# Patient Record
Sex: MALE | Race: WHITE | Employment: UNEMPLOYED | URBAN - METROPOLITAN AREA
[De-identification: names, ages, dates, MRNs, and addresses within clinical notes are randomized per-mention and may not be internally consistent; named-entity substitution may affect disease eponyms.]

---

## 2018-02-01 ENCOUNTER — OFFICE VISIT (OUTPATIENT)
Dept: INTERNAL MEDICINE CLINIC | Facility: CLINIC | Age: 33
End: 2018-02-01

## 2018-02-01 VITALS
BODY MASS INDEX: 39.17 KG/M2 | HEIGHT: 75 IN | DIASTOLIC BLOOD PRESSURE: 85 MMHG | TEMPERATURE: 98 F | HEART RATE: 80 BPM | SYSTOLIC BLOOD PRESSURE: 125 MMHG | WEIGHT: 315 LBS

## 2018-02-01 DIAGNOSIS — F32.A ANXIETY AND DEPRESSION: ICD-10-CM

## 2018-02-01 DIAGNOSIS — S99.921A INJURY OF TOE ON RIGHT FOOT, INITIAL ENCOUNTER: Primary | ICD-10-CM

## 2018-02-01 DIAGNOSIS — F41.9 ANXIETY AND DEPRESSION: ICD-10-CM

## 2018-02-01 PROCEDURE — 99212 OFFICE O/P EST SF 10 MIN: CPT | Performed by: INTERNAL MEDICINE

## 2018-02-01 PROCEDURE — 99213 OFFICE O/P EST LOW 20 MIN: CPT | Performed by: INTERNAL MEDICINE

## 2018-02-01 RX ORDER — ALPRAZOLAM 2 MG/1
TABLET ORAL
COMMUNITY
Start: 2016-08-09

## 2018-02-01 RX ORDER — PROPRANOLOL HYDROCHLORIDE 20 MG/1
TABLET ORAL
Refills: 0 | COMMUNITY
Start: 2018-01-15

## 2018-02-01 RX ORDER — PAROXETINE HYDROCHLORIDE 20 MG/1
TABLET, FILM COATED ORAL
Refills: 0 | COMMUNITY
Start: 2018-01-15

## 2018-02-01 NOTE — PROGRESS NOTES
HPI:    Patient ID: Liza Mustafa is a 33year old male. Toe Injury    The incident occurred 2 days ago. The incident occurred at home. The injury mechanism was a direct blow (dresser/drawer fell and hit his right toe).  The pain is present in the behavior is normal. Judgment and thought content normal. His mood appears anxious. His affect is not angry, not blunt, not labile and not inappropriate.  Cognition and memory are normal.              ASSESSMENT/PLAN:   (Q22.592R) Injury of toe on right foot

## 2018-06-20 ENCOUNTER — TELEPHONE (OUTPATIENT)
Dept: INTERNAL MEDICINE CLINIC | Facility: CLINIC | Age: 33
End: 2018-06-20

## 2018-06-20 DIAGNOSIS — R68.84 JAW PAIN: Primary | ICD-10-CM

## 2018-06-20 DIAGNOSIS — H92.02 LEFT EAR PAIN: ICD-10-CM

## 2018-06-20 NOTE — TELEPHONE ENCOUNTER
Spoke with patient and relayed EL message below--patient verbalizes understanding and agreement. Relayed ENT # to call for appt. Referral entered. No further questions/concerns at this time.

## 2018-06-20 NOTE — TELEPHONE ENCOUNTER
Pt states a couple of years ago had an infected tooth and that tooth ended up getting pulled along with the tooth next to it.  States since then has had constant jaw soreness and left ear gets white discharge instead of usual yellow ear wax, and itches Stat

## 2018-06-26 ENCOUNTER — OFFICE VISIT (OUTPATIENT)
Dept: OTOLARYNGOLOGY | Facility: CLINIC | Age: 33
End: 2018-06-26

## 2018-06-26 VITALS
HEIGHT: 74 IN | TEMPERATURE: 98 F | WEIGHT: 315 LBS | SYSTOLIC BLOOD PRESSURE: 98 MMHG | HEART RATE: 126 BPM | DIASTOLIC BLOOD PRESSURE: 69 MMHG | BODY MASS INDEX: 40.43 KG/M2

## 2018-06-26 DIAGNOSIS — K12.0 APHTHOUS ULCER: ICD-10-CM

## 2018-06-26 DIAGNOSIS — H60.8X3 CHRONIC ECZEMATOUS OTITIS EXTERNA OF BOTH EARS: Primary | ICD-10-CM

## 2018-06-26 PROCEDURE — 99212 OFFICE O/P EST SF 10 MIN: CPT | Performed by: OTOLARYNGOLOGY

## 2018-06-26 PROCEDURE — 99243 OFF/OP CNSLTJ NEW/EST LOW 30: CPT | Performed by: OTOLARYNGOLOGY

## 2018-06-26 RX ORDER — PAROXETINE HYDROCHLORIDE 40 MG/1
TABLET, FILM COATED ORAL
Refills: 2 | COMMUNITY
Start: 2018-06-06

## 2018-06-26 RX ORDER — FLUOCINOLONE ACETONIDE 0.11 MG/ML
3 OIL AURICULAR (OTIC) 3 TIMES DAILY
Qty: 1 BOTTLE | Refills: 0 | Status: SHIPPED | OUTPATIENT
Start: 2018-06-26 | End: 2018-07-03

## 2018-06-26 RX ORDER — VALACYCLOVIR HYDROCHLORIDE 500 MG/1
500 TABLET, FILM COATED ORAL EVERY 8 HOURS
Qty: 10 TABLET | Refills: 0 | Status: SHIPPED | OUTPATIENT
Start: 2018-06-26 | End: 2018-07-03

## 2018-06-27 NOTE — PROGRESS NOTES
Mari Zhong is a 29year old male. Patient presents with:  Ear Problem: itchy left ear, white discharge   Throat Problem: swelling right side of throat     HPI:   He has been experiencing problems with irritation in his ears.   He feels that there Skin Normal Inspection - Normal.   Constitutional Normal Overall appearance - Normal.   Head/Face Normal Facial features - Normal. Eyebrows - Normal. Skull - Normal.   Oral/Oropharynx Normal Lips -ulceration involving the left upper lip, Tonsils - Normal

## 2018-08-17 ENCOUNTER — TELEPHONE (OUTPATIENT)
Dept: OTOLARYNGOLOGY | Facility: CLINIC | Age: 33
End: 2018-08-17

## 2018-08-17 RX ORDER — NEOMYCIN SULFATE, POLYMYXIN B SULFATE AND HYDROCORTISONE 10; 3.5; 1 MG/ML; MG/ML; [USP'U]/ML
3 SUSPENSION/ DROPS AURICULAR (OTIC) 2 TIMES DAILY
Qty: 1 BOTTLE | Refills: 0 | Status: SHIPPED | OUTPATIENT
Start: 2018-08-17 | End: 2020-03-31

## 2018-08-17 NOTE — TELEPHONE ENCOUNTER
Pt states blood came out of his ear today, states it was a small amount, asking if he should make appt or what to do, requesting to speak to RN first. Pls call thank you.

## 2018-08-17 NOTE — TELEPHONE ENCOUNTER
Per pt having some blood discharge from left ear, no pain. Pt asking if he should be seen first. Per  send in rx for cortisporin otic sup 3 BID for 1 week and pt to follow up after ear drops used.

## 2019-04-22 ENCOUNTER — TELEPHONE (OUTPATIENT)
Dept: INTERNAL MEDICINE CLINIC | Facility: CLINIC | Age: 34
End: 2019-04-22

## 2019-04-22 NOTE — TELEPHONE ENCOUNTER
Thena Public is calling would like to inform Dr. Saulo Miller that pt was admitted on April 12, 2019 for major depression. Pt is at [facility].

## 2019-04-23 NOTE — TELEPHONE ENCOUNTER
[facility] Coordinator with [employer]  ci to advise Dr. Lc Alexander that patient was discharged from [facility] in 04/22/2019.

## 2019-04-26 ENCOUNTER — HOSPITAL ENCOUNTER (OUTPATIENT)
Dept: GENERAL RADIOLOGY | Age: 34
Discharge: HOME | End: 2019-04-26
Attending: INTERNAL MEDICINE
Payer: MEDICAID

## 2019-04-26 DIAGNOSIS — M54.2 NECK PAIN: ICD-10-CM

## 2019-04-26 PROCEDURE — 72040 X-RAY EXAM NECK SPINE 2-3 VW: CPT | Performed by: INTERNAL MEDICINE

## 2019-04-26 NOTE — PROGRESS NOTES
HPI:    Patient ID: Karlene Ahuja is a 36year old male. Anxiety   This is a chronic problem. The current episode started more than 1 year ago. The problem occurs intermittently. The problem has been waxing and waning.  Associated symptoms include C PO D Disp:  Rfl: 2   alprazolam 2 MG Oral Tab  Disp:  Rfl:    Methadone HCl (DOLOPHINE) 10 MG Oral Tab Take 10 mg by mouth every 6 (six) hours as needed for Pain. Pt. state he take 40 mg daily.  Disp:  Rfl:    Neomycin-Polymyxin-HC 3.5-32155-4 Otic Suspen headache, unspecified headache type  Plan: pt had been having headaches for more than a year and hasnt really changed from the start.  Typically starts from his neck and radiates to occipital area so I suspect likely muscle contraction headache;  Would star

## 2019-04-27 PROBLEM — M54.2 NECK PAIN: Status: ACTIVE | Noted: 2019-04-27

## 2019-04-27 PROBLEM — G89.29 CHRONIC NONINTRACTABLE HEADACHE: Status: ACTIVE | Noted: 2019-04-27

## 2019-04-27 PROBLEM — R51.9 CHRONIC NONINTRACTABLE HEADACHE: Status: ACTIVE | Noted: 2019-04-27

## 2019-04-30 ENCOUNTER — TELEPHONE (OUTPATIENT)
Dept: INTERNAL MEDICINE CLINIC | Facility: CLINIC | Age: 34
End: 2019-04-30

## 2019-04-30 DIAGNOSIS — M54.2 CHRONIC NECK PAIN: Primary | ICD-10-CM

## 2019-04-30 DIAGNOSIS — M47.812 SPONDYLOSIS OF CERVICAL REGION WITHOUT MYELOPATHY OR RADICULOPATHY: ICD-10-CM

## 2019-04-30 DIAGNOSIS — G89.29 CHRONIC NECK PAIN: Primary | ICD-10-CM

## 2019-05-07 ENCOUNTER — TELEPHONE (OUTPATIENT)
Dept: OTHER | Age: 34
End: 2019-05-07

## 2019-05-07 NOTE — TELEPHONE ENCOUNTER
The patient stated he tried to contact PT via Qoviat and was unable.    The phone number was sent to the patient via Twingly per his request.

## 2019-06-18 ENCOUNTER — APPOINTMENT (OUTPATIENT)
Dept: PHYSICAL THERAPY | Age: 34
End: 2019-06-18
Attending: INTERNAL MEDICINE
Payer: MEDICAID

## 2019-06-20 ENCOUNTER — APPOINTMENT (OUTPATIENT)
Dept: PHYSICAL THERAPY | Age: 34
End: 2019-06-20
Attending: INTERNAL MEDICINE
Payer: MEDICAID

## 2019-06-24 ENCOUNTER — APPOINTMENT (OUTPATIENT)
Dept: PHYSICAL THERAPY | Age: 34
End: 2019-06-24
Attending: INTERNAL MEDICINE
Payer: MEDICAID

## 2019-06-26 ENCOUNTER — APPOINTMENT (OUTPATIENT)
Dept: PHYSICAL THERAPY | Age: 34
End: 2019-06-26
Attending: INTERNAL MEDICINE
Payer: MEDICAID

## 2019-06-27 ENCOUNTER — OFFICE VISIT (OUTPATIENT)
Dept: INTERNAL MEDICINE CLINIC | Facility: CLINIC | Age: 34
End: 2019-06-27
Payer: MEDICAID

## 2019-06-27 ENCOUNTER — LAB ENCOUNTER (OUTPATIENT)
Dept: LAB | Age: 34
End: 2019-06-27
Attending: INTERNAL MEDICINE
Payer: MEDICAID

## 2019-06-27 VITALS
DIASTOLIC BLOOD PRESSURE: 81 MMHG | SYSTOLIC BLOOD PRESSURE: 132 MMHG | HEIGHT: 74 IN | BODY MASS INDEX: 40.43 KG/M2 | TEMPERATURE: 98 F | RESPIRATION RATE: 24 BRPM | HEART RATE: 99 BPM | WEIGHT: 315 LBS

## 2019-06-27 DIAGNOSIS — L65.9 ALOPECIA: ICD-10-CM

## 2019-06-27 DIAGNOSIS — K92.1 HEMATOCHEZIA: ICD-10-CM

## 2019-06-27 DIAGNOSIS — L98.9 SKIN LESION OF SCALP: Primary | ICD-10-CM

## 2019-06-27 PROCEDURE — 99214 OFFICE O/P EST MOD 30 MIN: CPT | Performed by: INTERNAL MEDICINE

## 2019-06-27 PROCEDURE — 84443 ASSAY THYROID STIM HORMONE: CPT

## 2019-06-27 PROCEDURE — 99212 OFFICE O/P EST SF 10 MIN: CPT | Performed by: INTERNAL MEDICINE

## 2019-06-27 PROCEDURE — 85025 COMPLETE CBC W/AUTO DIFF WBC: CPT

## 2019-06-27 PROCEDURE — 36415 COLL VENOUS BLD VENIPUNCTURE: CPT

## 2019-06-28 NOTE — PROGRESS NOTES
HPI:    Patient ID: Jules Sanchez is a 36year old male. Skin   This is a chronic (pt complains of scalp lesions) problem. The current episode started more than 1 year ago. The problem occurs constantly. The problem has been unchanged.  Pertinent 2   alprazolam 2 MG Oral Tab  Disp:  Rfl:    PARoxetine HCl 20 MG Oral Tab  Disp:  Rfl: 0   Propranolol HCl 20 MG Oral Tab  Disp:  Rfl: 0   Methadone HCl (DOLOPHINE) 10 MG Oral Tab Take 10 mg by mouth every 6 (six) hours as needed for Pain.  Pt. state he ta Imaging & Referrals:  [facility] - INTERNAL  [facility] - INTERNAL       #[id number]

## 2019-07-01 ENCOUNTER — APPOINTMENT (OUTPATIENT)
Dept: PHYSICAL THERAPY | Age: 34
End: 2019-07-01
Attending: INTERNAL MEDICINE
Payer: MEDICAID

## 2019-07-03 ENCOUNTER — APPOINTMENT (OUTPATIENT)
Dept: PHYSICAL THERAPY | Age: 34
End: 2019-07-03
Attending: INTERNAL MEDICINE
Payer: MEDICAID

## 2019-07-10 ENCOUNTER — OFFICE VISIT (OUTPATIENT)
Dept: PHYSICAL THERAPY | Age: 34
End: 2019-07-10
Attending: INTERNAL MEDICINE
Payer: MEDICAID

## 2019-07-10 DIAGNOSIS — G89.29 CHRONIC NECK PAIN: ICD-10-CM

## 2019-07-10 DIAGNOSIS — M54.2 CHRONIC NECK PAIN: ICD-10-CM

## 2019-07-10 DIAGNOSIS — M47.812 SPONDYLOSIS OF CERVICAL REGION WITHOUT MYELOPATHY OR RADICULOPATHY: ICD-10-CM

## 2019-07-10 PROCEDURE — 97110 THERAPEUTIC EXERCISES: CPT

## 2019-07-10 PROCEDURE — 97161 PT EVAL LOW COMPLEX 20 MIN: CPT

## 2019-07-10 NOTE — PROGRESS NOTES
P.T. EVALUATION:   Referring Physician: Dr. Robbin Whittaker  Diagnosis: Chronic neck pain (M54.2,G89.29)  Spondylosis of cervical region without myelopathy or radiculopathy (Z91.115)    Date of Onset: June 2019 Date of Service: 7/10/2019     PATIENT SUMMARY levator scap muscle    Sensation: intermittent tingling to B fingers    AROM:   Cervical ROM:  Flx: WNL (pain increases)  Ext: Min loss/WNL (pain increases)  Rot: R min loss, L min loss (pain increases on opposite side of direction of each turn)  Lat flx: soon as possible to [phone].  If you have any questions, please contact me at Dept: [phone]    Sincerely,  Electronically signed by therapist: Carla Santiago PT, DPT    [de-identified] certification required: Yes  I certify the need for these services

## 2019-07-12 ENCOUNTER — TELEPHONE (OUTPATIENT)
Dept: PHYSICAL THERAPY | Age: 34
End: 2019-07-12

## 2019-07-16 ENCOUNTER — OFFICE VISIT (OUTPATIENT)
Dept: PHYSICAL THERAPY | Age: 34
End: 2019-07-16
Attending: INTERNAL MEDICINE
Payer: MEDICAID

## 2019-07-16 DIAGNOSIS — G89.29 CHRONIC NECK PAIN: ICD-10-CM

## 2019-07-16 DIAGNOSIS — M47.812 SPONDYLOSIS OF CERVICAL REGION WITHOUT MYELOPATHY OR RADICULOPATHY: ICD-10-CM

## 2019-07-16 DIAGNOSIS — M54.2 CHRONIC NECK PAIN: ICD-10-CM

## 2019-07-16 PROCEDURE — 97110 THERAPEUTIC EXERCISES: CPT

## 2019-07-16 NOTE — PROGRESS NOTES
Diagnosis: Chronic neck pain (M54.2,G89.29)  Spondylosis of cervical region without myelopathy or radiculopathy (M47.812)    Date of Onset: June 2019        Next MD visit: none scheduled  Fall Risk: standard         Precautions: n/a          Medication Jacklyn

## 2019-07-18 ENCOUNTER — OFFICE VISIT (OUTPATIENT)
Dept: PHYSICAL THERAPY | Age: 34
End: 2019-07-18
Attending: INTERNAL MEDICINE
Payer: MEDICAID

## 2019-07-18 DIAGNOSIS — G89.29 CHRONIC NECK PAIN: ICD-10-CM

## 2019-07-18 DIAGNOSIS — M47.812 SPONDYLOSIS OF CERVICAL REGION WITHOUT MYELOPATHY OR RADICULOPATHY: ICD-10-CM

## 2019-07-18 DIAGNOSIS — M54.2 CHRONIC NECK PAIN: ICD-10-CM

## 2019-07-18 PROCEDURE — 97110 THERAPEUTIC EXERCISES: CPT

## 2019-07-18 PROCEDURE — 97140 MANUAL THERAPY 1/> REGIONS: CPT

## 2019-07-18 NOTE — PROGRESS NOTES
Diagnosis: Chronic neck pain (M54.2,G89.29)  Spondylosis of cervical region without myelopathy or radiculopathy (M47.812)    Date of Onset: June 2019        Next MD visit: none scheduled  Fall Risk: standard         Precautions: n/a          Medication Jacklyn and pain and progressed reps of scapular exercises with decreased pain noted post session. PT and patient goals are in progress.      Goals:    1- Pt will be I with maintenance and progression of HEP  2- Pt will demo increase in cervical ROM To WNL to ease

## 2019-07-23 ENCOUNTER — OFFICE VISIT (OUTPATIENT)
Dept: PHYSICAL THERAPY | Age: 34
End: 2019-07-23
Attending: INTERNAL MEDICINE
Payer: MEDICAID

## 2019-07-23 DIAGNOSIS — M47.812 SPONDYLOSIS OF CERVICAL REGION WITHOUT MYELOPATHY OR RADICULOPATHY: ICD-10-CM

## 2019-07-23 DIAGNOSIS — G89.29 CHRONIC NECK PAIN: ICD-10-CM

## 2019-07-23 DIAGNOSIS — M54.2 CHRONIC NECK PAIN: ICD-10-CM

## 2019-07-23 PROCEDURE — 97140 MANUAL THERAPY 1/> REGIONS: CPT

## 2019-07-23 PROCEDURE — 97110 THERAPEUTIC EXERCISES: CPT

## 2019-07-23 NOTE — PROGRESS NOTES
Diagnosis: Chronic neck pain (M54.2,G89.29)  Spondylosis of cervical region without myelopathy or radiculopathy (M47.812)    Date of Onset: June 2019        Next MD visit: none scheduled  Fall Risk: standard         Precautions: n/a          Medication Jacklyn ball OP 2 x 10   - seated C-extension with towel OP 2 x 10  - supine C-retraction with 1 pillow 5\" holds 2 x 10     - supine C-rotation AROM 1 x 10 each  - MARCIANO C-extension with self OP 1 x 10   - prone B shoulder extension with palms in toward sides 2 x 1

## 2019-07-25 ENCOUNTER — OFFICE VISIT (OUTPATIENT)
Dept: PHYSICAL THERAPY | Age: 34
End: 2019-07-25
Attending: INTERNAL MEDICINE
Payer: MEDICAID

## 2019-07-25 DIAGNOSIS — M47.812 SPONDYLOSIS OF CERVICAL REGION WITHOUT MYELOPATHY OR RADICULOPATHY: ICD-10-CM

## 2019-07-25 DIAGNOSIS — M54.2 CHRONIC NECK PAIN: ICD-10-CM

## 2019-07-25 DIAGNOSIS — G89.29 CHRONIC NECK PAIN: ICD-10-CM

## 2019-07-25 PROCEDURE — 97110 THERAPEUTIC EXERCISES: CPT

## 2019-07-25 PROCEDURE — 97140 MANUAL THERAPY 1/> REGIONS: CPT

## 2019-07-25 NOTE — PROGRESS NOTES
Diagnosis: Chronic neck pain (M54.2,G89.29)  Spondylosis of cervical region without myelopathy or radiculopathy (M47.812)    Date of Onset: June 2019        Next MD visit: none scheduled  Fall Risk: standard         Precautions: n/a          Medication Jacklyn cord 2 x 10    - standing B shoulder extension with RSC 2 x 10   X 30 minutes  - seated C-retraction 2 x 10   - seated thoracic extension with ball OP 1 x 10   - seated C-extension with towel OP 1 x 10  - supine C-retraction with 1 pillow 10\" holds 1 x 10

## 2019-08-06 ENCOUNTER — APPOINTMENT (OUTPATIENT)
Dept: PHYSICAL THERAPY | Age: 34
End: 2019-08-06
Attending: INTERNAL MEDICINE
Payer: MEDICAID

## 2019-08-07 ENCOUNTER — APPOINTMENT (OUTPATIENT)
Dept: PHYSICAL THERAPY | Age: 34
End: 2019-08-07
Attending: INTERNAL MEDICINE
Payer: MEDICAID

## 2019-09-09 ENCOUNTER — OFFICE VISIT (OUTPATIENT)
Dept: DERMATOLOGY CLINIC | Facility: CLINIC | Age: 34
End: 2019-09-09
Payer: MEDICAID

## 2019-09-09 DIAGNOSIS — L65.9 ALOPECIA: Primary | ICD-10-CM

## 2019-09-09 DIAGNOSIS — D48.5 NEOPLASM OF UNCERTAIN BEHAVIOR OF SKIN: ICD-10-CM

## 2019-09-09 DIAGNOSIS — D22.9 MULTIPLE NEVI: ICD-10-CM

## 2019-09-09 PROCEDURE — 99203 OFFICE O/P NEW LOW 30 MIN: CPT | Performed by: DERMATOLOGY

## 2019-09-09 RX ORDER — FINASTERIDE 1 MG/1
1 TABLET, FILM COATED ORAL DAILY
Qty: 90 TABLET | Refills: 5 | Status: SHIPPED | OUTPATIENT
Start: 2019-09-09

## 2019-09-22 NOTE — PROGRESS NOTES
Karlene Ahuja is a 36year old male. Patient presents with:  Lesion: New patient. Pt c/o of lesions to the top of the head that have gotten worse. Pt states they hurt when he vargas his hair. No personal h/x of skin cancer.  unknown family h/x (two) times daily.  Disp: 1 Bottle Rfl: 0   PARoxetine HCl 40 MG Oral Tab TK 1 T PO D Disp:  Rfl: 2   PARoxetine HCl 20 MG Oral Tab  Disp:  Rfl: 0   Propranolol HCl 20 MG Oral Tab  Disp:  Rfl: 0     Allergies:   No Known Allergies    Past Medical History: Not on file    Family History   Problem Relation Age of Onset   • Heart Attack Father    • Heart Attack Paternal Grandfather                       HPI :      Patient presents with:  Lesion: New patient.   Pt c/o of lesions to the top of the head that have g lesions likely congenital.  Has had excisions in the past.  We will have him follow-up with ENT for excision of multiple scalp lesions.     Other benign-appearing nevi reassurance    No pattern alopecia discussed topical minoxidil versus Propecia side effec

## 2020-02-25 ENCOUNTER — NURSE TRIAGE (OUTPATIENT)
Dept: INTERNAL MEDICINE CLINIC | Facility: CLINIC | Age: 35
End: 2020-02-25

## 2020-02-25 NOTE — TELEPHONE ENCOUNTER
Pt notified of Dr. Shahid Hayden message below. If symptoms worsen please go to ER pt verbalizes understanding.

## 2020-02-25 NOTE — TELEPHONE ENCOUNTER
Action Requested: Summary for Provider     []  Critical Lab, Recommendations Needed  [] Need Additional Advice  []   FYI    []   Need Orders  [] Need Medications Sent to Pharmacy  []  Other     SUMMARY: Pt c/o headache ongoing for months, had scheduled [facility]

## 2020-02-25 NOTE — TELEPHONE ENCOUNTER
Patient sent a message via [address], please see below:      Appointment For: Billie Roselia ([id number])   Visit Type: Britni Ronquillo ([id number])      2/26/2020    1:00 PM  15 mins. Samuel Davison MD  [facility]      Patient Comments:   Abimael Ferrer

## 2020-03-12 ENCOUNTER — OFFICE VISIT (OUTPATIENT)
Dept: INTERNAL MEDICINE CLINIC | Facility: CLINIC | Age: 35
End: 2020-03-12
Payer: MEDICAID

## 2020-03-12 VITALS
HEIGHT: 74 IN | TEMPERATURE: 98 F | DIASTOLIC BLOOD PRESSURE: 81 MMHG | HEART RATE: 80 BPM | BODY MASS INDEX: 40.43 KG/M2 | WEIGHT: 315 LBS | SYSTOLIC BLOOD PRESSURE: 113 MMHG

## 2020-03-12 DIAGNOSIS — G44.229 CHRONIC TENSION-TYPE HEADACHE, NOT INTRACTABLE: Primary | ICD-10-CM

## 2020-03-12 PROCEDURE — 99214 OFFICE O/P EST MOD 30 MIN: CPT | Performed by: INTERNAL MEDICINE

## 2020-03-12 RX ORDER — ARIPIPRAZOLE 5 MG/1
1 TABLET ORAL DAILY
COMMUNITY
Start: 2020-03-01

## 2020-03-12 NOTE — PROGRESS NOTES
HPI:    Patient ID: Mari Zhong is a 30year old male. Headache    This is a chronic problem. The current episode started more than 1 year ago (several years per pt). The problem occurs constantly. The problem has been gradually worsening.  The tablet 5   • Neomycin-Polymyxin-HC 3.5-38953-6 Otic Suspension Place 3 drops into the left ear 2 (two) times daily.  (Patient not taking: Reported on 3/12/2020 ) 1 Bottle 0   • PARoxetine HCl 40 MG Oral Tab TK 1 T PO D  2   • PARoxetine HCl 20 MG Oral Tab diaphoretic.               ASSESSMENT/PLAN:   (G44.229) Chronic tension-type headache, not intractable  (primary encounter diagnosis)  Plan: [facility] - INTERNAL        His headaches starts from his upper neck going to occipital area and also down to the upper

## 2020-03-25 ENCOUNTER — TELEPHONE (OUTPATIENT)
Dept: INTERNAL MEDICINE CLINIC | Facility: CLINIC | Age: 35
End: 2020-03-25

## 2020-03-25 NOTE — TELEPHONE ENCOUNTER
Pt scheduled appt through [employer] with following sx:    Appointment For: Daniel Benton ([id number])   Visit Type: Angelita Recinos ([id number])      3/27/2020   10:30 AM  15 mins. Snehal Ken MD  [facility]-INTERNAL [facility]      Patient Comments:   Headache

## 2020-03-30 ENCOUNTER — NURSE TRIAGE (OUTPATIENT)
Dept: INTERNAL MEDICINE CLINIC | Facility: CLINIC | Age: 35
End: 2020-03-30

## 2020-03-30 NOTE — TELEPHONE ENCOUNTER
The patient was informed. Instructed they are taking temperatures and if 100 or above he will not be seen with understanding.

## 2020-03-30 NOTE — TELEPHONE ENCOUNTER
Patient scheduled an appointment for tomorrow 03/31/2020 with Dr. Carla Moreira due to ear pain/ infection. Please contact patient.

## 2020-03-30 NOTE — TELEPHONE ENCOUNTER
Action Requested: Summary for Provider     []  Critical Lab, Recommendations Needed  [] Need Additional Advice  []   FYI    []   Need Orders  [] Need Medications Sent to Pharmacy  []  Other     SUMMARY: Per new process, please advise if keeping appt for to

## 2020-03-31 ENCOUNTER — OFFICE VISIT (OUTPATIENT)
Dept: INTERNAL MEDICINE CLINIC | Facility: CLINIC | Age: 35
End: 2020-03-31
Payer: MEDICAID

## 2020-03-31 VITALS
DIASTOLIC BLOOD PRESSURE: 84 MMHG | RESPIRATION RATE: 12 BRPM | TEMPERATURE: 99 F | BODY MASS INDEX: 39.17 KG/M2 | WEIGHT: 315 LBS | HEART RATE: 120 BPM | SYSTOLIC BLOOD PRESSURE: 117 MMHG | HEIGHT: 75 IN

## 2020-03-31 DIAGNOSIS — H60.502 ACUTE OTITIS EXTERNA OF LEFT EAR, UNSPECIFIED TYPE: Primary | ICD-10-CM

## 2020-03-31 PROCEDURE — 99214 OFFICE O/P EST MOD 30 MIN: CPT | Performed by: INTERNAL MEDICINE

## 2020-03-31 RX ORDER — AMOXICILLIN 875 MG/1
875 TABLET, COATED ORAL 2 TIMES DAILY
Qty: 14 TABLET | Refills: 0 | Status: SHIPPED | OUTPATIENT
Start: 2020-03-31

## 2020-03-31 RX ORDER — NEOMYCIN SULFATE, POLYMYXIN B SULFATE AND HYDROCORTISONE 10; 3.5; 1 MG/ML; MG/ML; [USP'U]/ML
4 SUSPENSION/ DROPS AURICULAR (OTIC) 4 TIMES DAILY
Qty: 1 BOTTLE | Refills: 0 | Status: SHIPPED | OUTPATIENT
Start: 2020-03-31

## 2020-03-31 NOTE — PROGRESS NOTES
HPI:    Patient ID: Levie Scheuermann is a 30year old male. Ear Pain    There is pain in the left ear. This is a new problem. The current episode started in the past 7 days (2 to 3 days). The problem occurs constantly.  The problem has been unchanged HENT:   Right Ear: Tympanic membrane, external ear and ear canal normal.   Left Ear: There is swelling and tenderness. No drainage. No foreign bodies. Decreased hearing is noted.    Nose: Nose normal.   Eyes: Pupils are equal, round, and reactive to light

## 2020-06-17 ENCOUNTER — TELEPHONE (OUTPATIENT)
Dept: NEUROLOGY | Facility: CLINIC | Age: 35
End: 2020-06-17

## 2020-06-17 ENCOUNTER — TELEMEDICINE (OUTPATIENT)
Dept: NEUROLOGY | Facility: CLINIC | Age: 35
End: 2020-06-17

## 2020-06-17 DIAGNOSIS — G43.111 INTRACTABLE MIGRAINE WITH AURA WITH STATUS MIGRAINOSUS: ICD-10-CM

## 2020-06-17 DIAGNOSIS — R51.9 NEW ONSET HEADACHE: Primary | ICD-10-CM

## 2020-06-17 PROCEDURE — 99204 OFFICE O/P NEW MOD 45 MIN: CPT | Performed by: OTHER

## 2020-06-17 RX ORDER — SUMATRIPTAN 100 MG/1
TABLET, FILM COATED ORAL
Qty: 9 TABLET | Refills: 3 | Status: SHIPPED | OUTPATIENT
Start: 2020-06-17

## 2020-06-17 RX ORDER — TOPIRAMATE 25 MG/1
TABLET ORAL
Qty: 90 TABLET | Refills: 3 | Status: SHIPPED | OUTPATIENT
Start: 2020-06-17

## 2020-06-17 NOTE — TELEPHONE ENCOUNTER
[employer] for authorization of approval for MRI brain w/wo cpt code 58219. Approval was given with Authorization Number: [id number] effective 06/17/20 to 12/14/20. Will call Pt. To inform. L/m advsing of approval. Can proceed with scheduling appt.

## 2020-06-17 NOTE — PROGRESS NOTES
I conducted a telehealth visit with Jerry Palm today, 06/17/20, which was completed using two-way, real-time interactive audio and video communication.  This has been done in good sarah to provide continuity of care in the best interest of the vomiting. He does describe occasional visual aura of zigzag lines even though he is not sure if those are preceding his headaches or not. Patient denies any focal neurological symptoms otherwise.   He has not tried any medications as reported in June 20 2 mg by mouth every 6 (six) hours as needed for Pain. Pt. state he take 40 mg daily. , Disp: , Rfl:       Seasonal                OTHER (SEE COMMENTS)    Comment:Sneezing and runny nose.     ROS:   As in HPI, the rest of the 14 system review was done and was n onset headache  Since patient claims this is a new onset headache MRI of the brain will be done to rule out any other possibility but most likely disease migraine  - MRI BRAIN (W+WO) (CPT=70553); Future    2.  Intractable migraine with aura with status migr

## 2020-10-06 ENCOUNTER — NURSE TRIAGE (OUTPATIENT)
Dept: INTERNAL MEDICINE CLINIC | Facility: CLINIC | Age: 35
End: 2020-10-06

## 2020-10-06 NOTE — TELEPHONE ENCOUNTER
Left message to call back. Transfer to triage. Patient used ear drops previously for otitis externa (see visit note with Dr. Julio Cesar Davila on 3/31/20). RN needs to triage symptoms.

## 2020-10-06 NOTE — TELEPHONE ENCOUNTER
Current Outpatient Medications:     •  Neomycin-Polymyxin-HC 3.5-99835-9 Otic Suspension, Place 4 drops into the left ear 4 (four) times daily. , Disp: 1 Bottle, Rfl: 0

## 2020-10-07 RX ORDER — NEOMYCIN SULFATE, POLYMYXIN B SULFATE AND HYDROCORTISONE 10; 3.5; 1 MG/ML; MG/ML; [USP'U]/ML
4 SUSPENSION/ DROPS AURICULAR (OTIC) 4 TIMES DAILY
Qty: 1 BOTTLE | Refills: 0 | Status: SHIPPED | OUTPATIENT
Start: 2020-10-07 | End: 2020-10-14

## 2020-10-07 NOTE — TELEPHONE ENCOUNTER
Action Requested: Summary for Provider     []  Critical Lab, Recommendations Needed  [] Need Additional Advice  []   FYI    []   Need Orders  [] Need Medications Sent to Pharmacy  []  Other     SUMMARY: Please advise - Pt requesting ear drops, stated same

## 2021-03-16 ENCOUNTER — TELEMEDICINE (OUTPATIENT)
Dept: INTERNAL MEDICINE CLINIC | Facility: CLINIC | Age: 36
End: 2021-03-16
Payer: MEDICAID

## 2021-03-16 DIAGNOSIS — G44.229 CHRONIC TENSION-TYPE HEADACHE, NOT INTRACTABLE: Primary | ICD-10-CM

## 2021-03-16 PROCEDURE — 99213 OFFICE O/P EST LOW 20 MIN: CPT | Performed by: INTERNAL MEDICINE

## 2021-03-19 NOTE — PROGRESS NOTES
HPI/Subjective:     Patient ID: Seda Padilla is a 29year old male. Headache   This is a chronic problem. The current episode started more than 1 year ago. The problem occurs intermittently. The problem has been waxing and waning.  The pain is lo MG Oral Tab   0   • Methadone HCl (DOLOPHINE) 10 MG Oral Tab Take 10 mg by mouth every 6 (six) hours as needed for Pain. Pt. state he take 40 mg daily. Allergies:  Seasonal                OTHER (SEE COMMENTS)    Comment:Sneezing and runny nose.     Pa

## 2021-04-30 ENCOUNTER — OFFICE VISIT (OUTPATIENT)
Dept: OTOLARYNGOLOGY | Facility: CLINIC | Age: 36
End: 2021-04-30
Payer: MEDICAID

## 2021-04-30 VITALS
HEIGHT: 75 IN | DIASTOLIC BLOOD PRESSURE: 93 MMHG | WEIGHT: 315 LBS | TEMPERATURE: 97 F | BODY MASS INDEX: 39.17 KG/M2 | SYSTOLIC BLOOD PRESSURE: 139 MMHG | HEART RATE: 94 BPM

## 2021-04-30 DIAGNOSIS — M54.2 NECK PAIN: Primary | ICD-10-CM

## 2021-04-30 PROCEDURE — 3080F DIAST BP >= 90 MM HG: CPT | Performed by: OTOLARYNGOLOGY

## 2021-04-30 PROCEDURE — 3008F BODY MASS INDEX DOCD: CPT | Performed by: OTOLARYNGOLOGY

## 2021-04-30 PROCEDURE — 99214 OFFICE O/P EST MOD 30 MIN: CPT | Performed by: OTOLARYNGOLOGY

## 2021-04-30 PROCEDURE — 3075F SYST BP GE 130 - 139MM HG: CPT | Performed by: OTOLARYNGOLOGY

## 2021-04-30 RX ORDER — TRAZODONE HYDROCHLORIDE 150 MG/1
150 TABLET ORAL NIGHTLY
COMMUNITY

## 2021-05-03 NOTE — PROGRESS NOTES
Talha Leon is a 29year old male.  Patient presents with:  Headache: per pt c/o of migranes, per pt pain most in back of head radiates to front, pt feels   Neck Pain: neck pain bilateral, pt had injury - pt also diagnosed with osteoarthrities Cigarettes      Smokeless tobacco: Never Used    Vaping Use      Vaping Use: Never used    Alcohol use: No      Alcohol/week: 0.0 standard drinks      Comment: rarely    Drug use: No      Comment: off drugs for 10 years       REVIEW OF SYSTEMS:   GENERAL H lot of his difficulty. I have referred him to our physiatry department for further evaluation and treatment. The patient indicates understanding of these issues and agrees to the plan. Georges Rizzo MD  5/3/2021  6:31 AM

## 2021-06-02 ENCOUNTER — EKG ENCOUNTER (OUTPATIENT)
Dept: LAB | Age: 36
End: 2021-06-02
Attending: EMERGENCY MEDICINE
Payer: MEDICAID

## 2021-06-02 ENCOUNTER — IMMUNIZATION (OUTPATIENT)
Dept: LAB | Facility: HOSPITAL | Age: 36
End: 2021-06-02
Attending: EMERGENCY MEDICINE
Payer: MEDICAID

## 2021-06-02 ENCOUNTER — LAB ENCOUNTER (OUTPATIENT)
Dept: LAB | Age: 36
End: 2021-06-02
Attending: EMERGENCY MEDICINE
Payer: MEDICAID

## 2021-06-02 DIAGNOSIS — F32.A DEPRESSION: Primary | ICD-10-CM

## 2021-06-02 DIAGNOSIS — Z23 NEED FOR VACCINATION: Primary | ICD-10-CM

## 2021-06-02 PROCEDURE — 80048 BASIC METABOLIC PNL TOTAL CA: CPT

## 2021-06-02 PROCEDURE — 93005 ELECTROCARDIOGRAM TRACING: CPT

## 2021-06-02 PROCEDURE — 36415 COLL VENOUS BLD VENIPUNCTURE: CPT

## 2021-06-02 PROCEDURE — 0001A SARSCOV2 VAC 30MCG/0.3ML IM: CPT

## 2021-06-02 PROCEDURE — 93010 ELECTROCARDIOGRAM REPORT: CPT

## 2021-06-02 PROCEDURE — 84443 ASSAY THYROID STIM HORMONE: CPT

## 2021-06-02 PROCEDURE — 85025 COMPLETE CBC W/AUTO DIFF WBC: CPT

## 2021-06-22 RX ORDER — NEOMYCIN SULFATE, POLYMYXIN B SULFATE AND HYDROCORTISONE 10; 3.5; 1 MG/ML; MG/ML; [USP'U]/ML
SUSPENSION/ DROPS AURICULAR (OTIC)
Qty: 10 ML | Refills: 0 | OUTPATIENT
Start: 2021-06-22

## 2021-06-23 ENCOUNTER — IMMUNIZATION (OUTPATIENT)
Dept: LAB | Facility: HOSPITAL | Age: 36
End: 2021-06-23
Attending: EMERGENCY MEDICINE
Payer: MEDICAID

## 2021-06-23 DIAGNOSIS — Z23 NEED FOR VACCINATION: Primary | ICD-10-CM

## 2021-06-23 PROCEDURE — 0002A SARSCOV2 VAC 30MCG/0.3ML IM: CPT

## 2021-06-30 ENCOUNTER — TELEPHONE (OUTPATIENT)
Dept: INTERNAL MEDICINE CLINIC | Facility: CLINIC | Age: 36
End: 2021-06-30

## 2021-06-30 NOTE — TELEPHONE ENCOUNTER
Pt asking that his EKG be sent to his psychiatrist-Dr Oscar Finn fax number [phone]. Faxed and confirmation received.

## 2021-09-08 ENCOUNTER — TELEPHONE (OUTPATIENT)
Dept: INTERNAL MEDICINE CLINIC | Facility: CLINIC | Age: 36
End: 2021-09-08

## 2021-09-08 DIAGNOSIS — R07.9 CHEST PAIN, UNSPECIFIED TYPE: Primary | ICD-10-CM

## 2021-09-08 NOTE — TELEPHONE ENCOUNTER
Charlie Salinas from ER at [facility] calling requesting if pt can have a cardiology referral sent over to them for a Dr Georgia Cartre.    Please advise.

## 2021-09-09 NOTE — TELEPHONE ENCOUNTER
Spoke to patient and asked him what he was seen for in the ER and he states he was having chest pain but it ended up being a panic attack but they still want him to follow up.     Referral has been pended please approve if appropriate

## 2022-05-17 ENCOUNTER — PATIENT MESSAGE (OUTPATIENT)
Dept: INTERNAL MEDICINE CLINIC | Facility: CLINIC | Age: 37
End: 2022-05-17

## 2022-05-17 NOTE — TELEPHONE ENCOUNTER
From: KELLY Bess  To: Silvia Hunt MD  Sent: 5/17/2022 2:39 PM CDT  Subject: Immobilized back pain    I made an appointment with you but it's a week or so out. Is it possible to get a referral to an orthopedic Dr. Without coming to your office.      Sincerely Lilibeth Peralta

## 2022-05-18 ENCOUNTER — NURSE TRIAGE (OUTPATIENT)
Dept: INTERNAL MEDICINE CLINIC | Facility: CLINIC | Age: 37
End: 2022-05-18

## 2022-05-18 NOTE — TELEPHONE ENCOUNTER
Per Dr. Marian Randle, please triage symptoms.      Request for Orthopedic       Future Appointments   Date Time Provider Elodia Trent   5/23/2022  2:30 PM Kavin Meyer MD [facility]

## 2022-05-18 NOTE — TELEPHONE ENCOUNTER
First Call attempt. Left Voicemail to call back our office. Office phone number provided with office hours. Advised check [email or web address] message.

## 2022-05-18 NOTE — TELEPHONE ENCOUNTER
Regarding: FW: Immobilized back pain      ----- Message -----  From: Marielle Skinner MD  Sent: 5/18/2022   8:07 AM CDT  To: [facility] Roland Triage  Subject: Immobilized back pain                            ----- Message from Savannah Scherer MD sent at 5/18/2022  8:07 AM CDT -----       ----- Message sent from Johana Georges RN to Anne Phelan at 5/17/2022  5:05 PM -----   Makenzie Little,    Your message has been received and forwarded to your provider for review. You will be contacted once a response is received. Thank you,    Valentina Genao RN      ----- Message -----       From:KELLY Bess       Sent:5/17/2022  2:39 PM CDT         To:Luke Yarbrough MD    Subject:Immobilized back pain    I made an appointment with you but it's a week or so out. Is it possible to get a referral to an orthopedic Dr. Without coming to your office.      Sincerely Farzana Garvin

## 2022-05-25 RX ORDER — BUSPIRONE HYDROCHLORIDE 15 MG/1
TABLET ORAL
COMMUNITY
Start: 2022-04-19

## 2024-06-21 ENCOUNTER — TELEPHONE (OUTPATIENT)
Dept: INTERNAL MEDICINE CLINIC | Facility: CLINIC | Age: 39
End: 2024-06-21

## 2024-06-21 NOTE — TELEPHONE ENCOUNTER
I called patient and made him aware of Dr. Yarbrough's office visit advised/offered --> Agreeable and scheduled. He will keep the schedule visit for 7/26/24 in case he needs a follow-up visit, he will cancel it if Dr. Yarbrough advises otherwise. Patient verbalized understanding. No further questions or concerns at this time.    Future Appointments   Date Time Provider Department Center   6/26/2024  2:30 PM Luke Yarbrough MD [facility]   7/26/2024  1:30 PM Luke Yarbrough MD [facility]

## 2024-06-21 NOTE — TELEPHONE ENCOUNTER
[facility] Dr. Yarbrough    Patient was contacted for reasons listed in scheduled visit. He states he is seeking a 2nd opinion and to re-establish care with Dr. Yarbrough who he had seen previously for several years. He is being treated for a blood clot by his current PCP; denies any active/acute symptoms at this time. He moved and was seeing a new provider with [facility]. He is also no longer with Medicaid and has a new plan that is [facility] PPO. No sooner visits for New Patient [last visit with Dr. Yarbrough was 3/31/2020. He will continue to follow-up with current PCP until he establishes care with Dr. Yarbrough, as scheduled, unless Dr. Yarbrough can see him sooner - he was made aware I will send message to Dr. Yarbrough. He will also obtain his medical records for visit. Patient verbalized understanding. No further questions or concerns at this time.    Future Appointments   Date Time Provider Department Center   7/26/2024  1:30 PM Luke Yarbrough MD [facility]

## 2024-06-26 ENCOUNTER — OFFICE VISIT (OUTPATIENT)
Dept: INTERNAL MEDICINE CLINIC | Facility: CLINIC | Age: 39
End: 2024-06-26

## 2024-06-26 VITALS
OXYGEN SATURATION: 98 % | SYSTOLIC BLOOD PRESSURE: 125 MMHG | WEIGHT: 315 LBS | HEART RATE: 109 BPM | BODY MASS INDEX: 40.43 KG/M2 | TEMPERATURE: 97 F | DIASTOLIC BLOOD PRESSURE: 90 MMHG | HEIGHT: 74 IN

## 2024-06-26 DIAGNOSIS — Z76.89 ENCOUNTER TO ESTABLISH CARE: Primary | ICD-10-CM

## 2024-06-26 DIAGNOSIS — E66.01 MORBID OBESITY WITH BMI OF 45.0-49.9, ADULT (HCC): ICD-10-CM

## 2024-06-26 DIAGNOSIS — E78.1 HYPERTRIGLYCERIDEMIA: ICD-10-CM

## 2024-06-26 DIAGNOSIS — F32.A ANXIETY AND DEPRESSION: ICD-10-CM

## 2024-06-26 DIAGNOSIS — E29.1 HYPOGONADISM, MALE: ICD-10-CM

## 2024-06-26 DIAGNOSIS — R79.89 ELEVATED SERUM CREATININE: ICD-10-CM

## 2024-06-26 DIAGNOSIS — I82.442 ACUTE DEEP VEIN THROMBOSIS (DVT) OF TIBIAL VEIN OF LEFT LOWER EXTREMITY (HCC): ICD-10-CM

## 2024-06-26 DIAGNOSIS — F41.9 ANXIETY AND DEPRESSION: ICD-10-CM

## 2024-06-26 DIAGNOSIS — E27.8 ADRENAL NODULE (HCC): ICD-10-CM

## 2024-06-26 PROCEDURE — 3008F BODY MASS INDEX DOCD: CPT | Performed by: INTERNAL MEDICINE

## 2024-06-26 PROCEDURE — 3080F DIAST BP >= 90 MM HG: CPT | Performed by: INTERNAL MEDICINE

## 2024-06-26 PROCEDURE — 3074F SYST BP LT 130 MM HG: CPT | Performed by: INTERNAL MEDICINE

## 2024-06-26 PROCEDURE — 99204 OFFICE O/P NEW MOD 45 MIN: CPT | Performed by: INTERNAL MEDICINE

## 2024-06-26 RX ORDER — APIXABAN 5 MG/1
5 TABLET, FILM COATED ORAL 2 TIMES DAILY
COMMUNITY
Start: 2024-06-13

## 2024-06-26 RX ORDER — FENOFIBRATE 134 MG/1
1 CAPSULE ORAL
COMMUNITY

## 2024-06-26 RX ORDER — TESTOSTERONE CYPIONATE 200 MG/ML
INJECTION, SOLUTION INTRAMUSCULAR
COMMUNITY
Start: 2024-05-02

## 2024-06-26 RX ORDER — DOXEPIN HYDROCHLORIDE 25 MG/1
1 CAPSULE ORAL NIGHTLY PRN
COMMUNITY

## 2024-06-26 NOTE — PROGRESS NOTES
Subjective:     Patient ID: Jac Bess is a 39 year old male.    Pt presents today to get established again in the clinic. He used to be patient of mine before .   He had DVT of left posterior tibial vein May 2024 and was seen in ER at that time, started on eliquis since then. Pt states had been seen by hematologist Dr Arizmendi already.    Had low testosterone 2023  and was started on testosterone therapy IM injectable  given by the pain management who had been managing pt's chronic neck/back pain.   Anxiety and depression currently being treated by psychiatrist.   Polycythemia noted recently per pt and had to have therapeutic phlebotomy. Pt has not had hx of polycythemia before.   Low HDL and triglyceride  and put on fenofibrate and Omega 3 FA.   Elevated creatinine on recent labs in ER. No previous history of CKD.   Adrenal nodule found last year but pt not aware of this.         History/Other:   Review of Systems   Constitutional: Negative.    HENT:  Positive for ear discharge.    Respiratory: Negative.     Cardiovascular: Negative.  Negative for chest pain, palpitations and leg swelling.   Gastrointestinal: Negative.    Genitourinary: Negative.    Allergic/Immunologic: Negative for immunocompromised state.     Current Outpatient Medications   Medication Sig Dispense Refill    doxepin 25 MG Oral Cap Take 1 capsule (25 mg total) by mouth nightly as needed.      ELIQUIS 5 MG Oral Tab Take 1 tablet (5 mg total) by mouth 2 (two) times daily.      fenofibrate micronized 134 MG Oral Cap Take 1 capsule (134 mg total) by mouth daily with food.      testosterone cypionate 200 mg/mL Intramuscular Solution ADMINISTER 1 ML IN THE MUSCLE EVERY WEEK AS DIRECTED      busPIRone 15 MG Oral Tab       ARIpiprazole 5 MG Oral Tab Take 1 tablet (5 mg total) by mouth daily.      Venlafaxine HCl  MG Oral Capsule SR 24 Hr TK ONE C PO D  2    alprazolam 2 MG Oral Tab       traZODone HCl 150 MG Oral Tab Take 1 tablet (150 mg  total) by mouth nightly. (Patient not taking: Reported on 6/26/2024)      Neomycin-Polymyxin-HC 3.5-35935-8 Otic Suspension Place 4 drops into the left ear 4 (four) times daily. (Patient not taking: Reported on 4/30/2021 ) 1 Bottle 0    Propranolol HCl 20 MG Oral Tab  (Patient not taking: Reported on 6/26/2024)  0     Allergies:  Allergies   Allergen Reactions    Seasonal OTHER (SEE COMMENTS)     Sneezing and runny nose.       Past Medical History:    Anxiety    Depression    DVT of lower extremity (deep venous thrombosis) (HCC)    left leg    Obesity    Panic disorder      Past Surgical History:   Procedure Laterality Date    Removal gallbladder      2020      Family History   Problem Relation Age of Onset    Heart Attack Father     Anxiety Mother     Heart Attack Paternal Grandfather       Social History:   Social History     Socioeconomic History    Marital status: Single   Tobacco Use    Smoking status: Former     Current packs/day: 0.50     Types: Cigarettes     Passive exposure: Past    Smokeless tobacco: Never   Vaping Use    Vaping status: Never Used   Substance and Sexual Activity    Alcohol use: No     Alcohol/week: 0.0 standard drinks of alcohol     Comment: rarely    Drug use: No     Comment: off drugs for 10 years        Objective:   Physical Exam  Constitutional:       General: He is not in acute distress.     Appearance: He is well-developed. He is obese. He is not ill-appearing, toxic-appearing or diaphoretic.   HENT:      Head: Normocephalic and atraumatic.      Right Ear: External ear normal.      Left Ear: External ear normal.      Nose: Nose normal.      Mouth/Throat:      Pharynx: No oropharyngeal exudate.   Eyes:      General:         Right eye: No discharge.         Left eye: No discharge.      Conjunctiva/sclera: Conjunctivae normal.      Pupils: Pupils are equal, round, and reactive to light.   Neck:      Vascular: No JVD.   Cardiovascular:      Rate and Rhythm: Normal rate and regular  rhythm.      Heart sounds: Normal heart sounds. No murmur heard.  Pulmonary:      Effort: Pulmonary effort is normal. No respiratory distress.      Breath sounds: Normal breath sounds. No wheezing or rales.   Abdominal:      General: Bowel sounds are normal. There is no distension.      Palpations: Abdomen is soft. There is no mass.      Tenderness: There is no abdominal tenderness. There is no guarding or rebound.   Musculoskeletal:         General: No tenderness. Normal range of motion.      Cervical back: Normal range of motion and neck supple.      Right lower leg: No edema.      Left lower leg: No edema.   Lymphadenopathy:      Cervical: No cervical adenopathy.   Skin:     General: Skin is warm and dry.      Findings: No rash.   Neurological:      Mental Status: He is alert and oriented to person, place, and time.         Assessment & Plan:   (Z76.89) Encounter to establish care  (primary encounter diagnosis)  Plan: pt establishing care again with the clnic. Current medical issues as discussed below.     (E29.1) Hypogonadism, male  Plan: Endocrine Referral - In Network        Diagnosed to have very low testosterone level last year after complaining of fatigue, was started apparently by his pain specialist on testosterone therapy since then. Pt states had been monitoring his labs, found to have erythrocytosis, eventually have to do phlebotomy treatment. No hx of polycythemia prior to start of testosterone therapy.  I advised to have our endo evaluate his hypogonadism .pt has no copies currently of baseline tests done when he was diagnosed to have low testosterone which will be important to know if primary or 2ndary hypogonadism .    (R79.89) Elevated serum creatinine  Plan: Basic Metabolic Panel (8), Urinalysis, Routine         [E], Protein,Total,Urine, Random [E],         Creatinine, Urine, Random [E]        Mildly elevated cratinine on last lab in ER, will repeat bmp and do ua.  Avoid nsaids.     (E27.8)  Adrenal nodule (HCC)  Plan: Endocrine Referral - In Network        [facility] a [facility] and showed left adrenal nodule 1.9cm by 1.7cm;  no further ffup done at that time, no hormonal studies. D/w pt/spouse, would also want endo to evaluate.  Both agreed.  Referral given .    (I82.442) Acute deep vein thrombosis (DVT) of tibial vein of left lower extremity (HCC)  Plan: just diagnosed with DVT of left leg last month, had been on eliquis since then. Already had seen hematologist DR Rhoades, and plan to treat for 3mos of eliquis.     (E78.1) Hypertriglyceridemia  Plan: pt on both fenofibrate and OMega 3 FA.     (F41.9,  F32.A) Anxiety and depression  Plan: pt states had been ffup with psychiatrist whom he had been seing now for at least a year, had been adjusting his psych meds.     (E66.01,  Z68.42) Morbid obesity with BMI of 45.0-49.9, adult (HCC)  Plan: pt advsied the need to lose weight; would benefit from seeing weight clinic.        No orders of the defined types were placed in this encounter.      Meds This Visit:  Requested Prescriptions      No prescriptions requested or ordered in this encounter       Imaging & Referrals:  None

## 2024-07-08 ENCOUNTER — LAB ENCOUNTER (OUTPATIENT)
Dept: LAB | Age: 39
End: 2024-07-08
Attending: INTERNAL MEDICINE
Payer: COMMERCIAL

## 2024-07-08 DIAGNOSIS — R79.89 ELEVATED SERUM CREATININE: ICD-10-CM

## 2024-07-08 LAB
ANION GAP SERPL CALC-SCNC: 4 MMOL/L (ref 0–18)
BILIRUB UR QL: NEGATIVE
BUN BLD-MCNC: 13 MG/DL (ref 9–23)
BUN/CREAT SERPL: 8.8 (ref 10–20)
CALCIUM BLD-MCNC: 9.3 MG/DL (ref 8.7–10.4)
CHLORIDE SERPL-SCNC: 107 MMOL/L (ref 98–112)
CLARITY UR: CLEAR
CO2 SERPL-SCNC: 30 MMOL/L (ref 21–32)
COLOR UR: YELLOW
CREAT BLD-MCNC: 1.47 MG/DL
CREAT UR-SCNC: 214.7 MG/DL
EGFRCR SERPLBLD CKD-EPI 2021: 62 ML/MIN/1.73M2 (ref 60–?)
FASTING STATUS PATIENT QL REPORTED: NO
GLUCOSE BLD-MCNC: 99 MG/DL (ref 70–99)
GLUCOSE UR-MCNC: NORMAL MG/DL
HGB UR QL STRIP.AUTO: NEGATIVE
KETONES UR-MCNC: NEGATIVE MG/DL
LEUKOCYTE ESTERASE UR QL STRIP.AUTO: 25
NITRITE UR QL STRIP.AUTO: NEGATIVE
OSMOLALITY SERPL CALC.SUM OF ELEC: 292 MOSM/KG (ref 275–295)
PH UR: 8 [PH] (ref 5–8)
POTASSIUM SERPL-SCNC: 4.5 MMOL/L (ref 3.5–5.1)
PROT UR-MCNC: 20 MG/DL (ref ?–14)
SODIUM SERPL-SCNC: 141 MMOL/L (ref 136–145)
SP GR UR STRIP: 1.02 (ref 1–1.03)
UROBILINOGEN UR STRIP-ACNC: NORMAL

## 2024-07-08 PROCEDURE — 80048 BASIC METABOLIC PNL TOTAL CA: CPT

## 2024-07-08 PROCEDURE — 81001 URINALYSIS AUTO W/SCOPE: CPT

## 2024-07-08 PROCEDURE — 36415 COLL VENOUS BLD VENIPUNCTURE: CPT

## 2024-07-08 PROCEDURE — 82570 ASSAY OF URINE CREATININE: CPT

## 2024-07-08 PROCEDURE — 84156 ASSAY OF PROTEIN URINE: CPT

## 2024-07-11 ENCOUNTER — OFFICE VISIT (OUTPATIENT)
Facility: LOCATION | Age: 39
End: 2024-07-11

## 2024-07-11 VITALS
BODY MASS INDEX: 40.43 KG/M2 | HEART RATE: 116 BPM | WEIGHT: 315 LBS | SYSTOLIC BLOOD PRESSURE: 118 MMHG | OXYGEN SATURATION: 96 % | HEIGHT: 74 IN | DIASTOLIC BLOOD PRESSURE: 70 MMHG

## 2024-07-11 DIAGNOSIS — Z86.718 HISTORY OF DVT (DEEP VEIN THROMBOSIS): ICD-10-CM

## 2024-07-11 DIAGNOSIS — E66.01 CLASS 3 SEVERE OBESITY WITH BODY MASS INDEX (BMI) OF 45.0 TO 49.9 IN ADULT, UNSPECIFIED OBESITY TYPE, UNSPECIFIED WHETHER SERIOUS COMORBIDITY PRESENT (HCC): ICD-10-CM

## 2024-07-11 DIAGNOSIS — F41.9 ANXIETY AND DEPRESSION: ICD-10-CM

## 2024-07-11 DIAGNOSIS — F41.9 ANXIETY: ICD-10-CM

## 2024-07-11 DIAGNOSIS — E27.8 LEFT ADRENAL MASS (HCC): Primary | ICD-10-CM

## 2024-07-11 DIAGNOSIS — Z79.01 BLOOD THINNED DUE TO LONG-TERM ANTICOAGULANT USE: ICD-10-CM

## 2024-07-11 DIAGNOSIS — R79.89 LOW TESTOSTERONE: ICD-10-CM

## 2024-07-11 DIAGNOSIS — E27.9 ADRENAL GLAND DISEASE (HCC): ICD-10-CM

## 2024-07-11 DIAGNOSIS — M54.2 NECK PAIN: ICD-10-CM

## 2024-07-11 DIAGNOSIS — F32.A ANXIETY AND DEPRESSION: ICD-10-CM

## 2024-07-11 DIAGNOSIS — G89.29 CHRONIC BACK PAIN, UNSPECIFIED BACK LOCATION, UNSPECIFIED BACK PAIN LATERALITY: ICD-10-CM

## 2024-07-11 DIAGNOSIS — M54.9 CHRONIC BACK PAIN, UNSPECIFIED BACK LOCATION, UNSPECIFIED BACK PAIN LATERALITY: ICD-10-CM

## 2024-07-11 PROBLEM — E66.813 CLASS 3 SEVERE OBESITY WITH BODY MASS INDEX (BMI) OF 45.0 TO 49.9 IN ADULT (HCC): Status: ACTIVE | Noted: 2024-07-11

## 2024-07-11 PROBLEM — E66.813 CLASS 3 SEVERE OBESITY WITH BODY MASS INDEX (BMI) OF 45.0 TO 49.9 IN ADULT: Status: ACTIVE | Noted: 2024-07-11

## 2024-07-11 PROCEDURE — 3074F SYST BP LT 130 MM HG: CPT | Performed by: INTERNAL MEDICINE

## 2024-07-11 PROCEDURE — 3078F DIAST BP <80 MM HG: CPT | Performed by: INTERNAL MEDICINE

## 2024-07-11 PROCEDURE — 3008F BODY MASS INDEX DOCD: CPT | Performed by: INTERNAL MEDICINE

## 2024-07-11 PROCEDURE — 99245 OFF/OP CONSLTJ NEW/EST HI 55: CPT | Performed by: INTERNAL MEDICINE

## 2024-07-11 RX ORDER — DEXAMETHASONE 2 MG/1
2 TABLET ORAL ONCE
Qty: 1 TABLET | Refills: 0 | Status: SHIPPED | OUTPATIENT
Start: 2024-07-11 | End: 2024-07-11

## 2024-07-11 RX ORDER — ERGOCALCIFEROL 1.25 MG/1
50000 CAPSULE ORAL WEEKLY
COMMUNITY
Start: 2024-06-30

## 2024-07-11 NOTE — PATIENT INSTRUCTIONS
Will do labs   8 am plasma free catecholamines/metanephrine, BMP, Ted/PRA, ACTH, am cortisol, DHEAs  Will do   dexamethasone suppression test - take  dexamethasone at midnight and do labs at 8 am the next morning (cortisol levels post dexamethasone)  MRI with adrenal protocol   Stop testosterone now   Will reassess hypogonadism in next visit        none

## 2024-07-11 NOTE — PROGRESS NOTES
New Patient Evaluation - History and Physical    CONSULT - Reason for Visit:   low testosterone , adrenal nodule      Requesting Physician:   ..Luke Yarbrough MD    CHIEF COMPLAINT:    Chief Complaint   Patient presents with    Consult     For hypogonadism and L adrenal nodule was found on CT of abdomen 5/23. Had labs 7/24. Was referred by Linnea Woo MD        HISTORY OF PRESENT ILLNESS:   Jac Bess is a 39 year old male who presents with  low testosterone on testosterone IM, Lt adrenal nodule on CT in 2023, DVT on blood thinner,  trying to conceive, chronic pain, methadone  use for ~ 10 yrs, obese     Was seen with his wife who is a nurse.   Has been on pain med for ~ 10 yrs. He had low enrgy.  Pain med doctor ordered testosterone and was started on tx in 2023.   He takes testo CYP.  200mg/IM 1 ML weekly   Started in 2023  Had DVT and was started on blood thinner     They are trying to conceive now and home test for sperm count which was < 25K   will see fertility specialist now      Puberty: unremarkable   He does not have  biological children.   Steroid: No   Anabolic steroids: No   Previous testosterone or \"supplements\" :  mg/week started 2023   Head trauma: No   Infection (mumps) or trauma to the testicles: No   Neck/back pain on methadone      CT ABD 5/2023 Left adrenal nodule, indeterminate. MRI is recommended for further characterization.   Gets chest pain /tightness but has THALIA /MDD  No striae   Has panic disorder   On psych meds   No HTN or spontaneous hypokalemia         ASSESSMENT AND PLAN:  39 year old man who presents with  low testosterone on testosterone IM, Lt adrenal nodule on CT in 2023, DVT on blood thinner,  trying to conceive, chronic pain, methadone  use for ~ 10 yrs, obese   Discussed with pt and his wife in length   They understand the need to stop testosterone for etiology w/u and for spermatogenesis.   He has been on methadone for yrs which can be the  etiology for hypogonadism and his symptoms    Lt adrenal on CT in 2023.   Clinically, no sx/sx of hyperaldo, cushing or pheo  He has nonspecific symptoms.   Needs biochemical w/u and MRI     Plan  Will do labs   8 am plasma free catecholamines/metanephrine, BMP, Ted/PRA, ACTH, am cortisol, DHEAs  Will do   dexamethasone suppression test - take  dexamethasone at midnight and do labs at 8 am the next morning (cortisol levels post dexamethasone)  MRI with adrenal protocol   Stop testosterone now   Will reassess hypogonadism in next visit         PAST MEDICAL HISTORY:   Past Medical History:    Anxiety    Depression    DVT of lower extremity (deep venous thrombosis) (HCC)    left leg    Obesity    Panic disorder   Chronic pain  Methadone use   Low testosterone   Low sperm count   THALIA/MDD     PAST SURGICAL HISTORY:   Past Surgical History:   Procedure Laterality Date    Removal gallbladder      2020       CURRENT MEDICATIONS:     ergocalciferol 1.25 MG (94726 UT) Oral Cap Take 1 capsule (50,000 Units total) by mouth once a week.      Omega-3 Fatty Acids (FISH OIL) 300 MG Oral Cap Take by mouth.      dexamethasone 2 MG Oral Tab Take 1 tablet (2 mg total) by mouth once for 1 dose. Take at midnight and do labs next day at 8 AM 1 tablet 0    doxepin 25 MG Oral Cap Take 1 capsule (25 mg total) by mouth nightly as needed.      ELIQUIS 5 MG Oral Tab Take 1 tablet (5 mg total) by mouth 2 (two) times daily.      fenofibrate micronized 134 MG Oral Cap Take 1 capsule (134 mg total) by mouth daily with food.      testosterone cypionate 200 mg/mL Intramuscular Solution ADMINISTER 1 ML IN THE MUSCLE EVERY WEEK AS DIRECTED      busPIRone 15 MG Oral Tab       ARIpiprazole 5 MG Oral Tab Take 1 tablet (5 mg total) by mouth daily.      Venlafaxine HCl  MG Oral Capsule SR 24 Hr TK ONE C PO D  2    alprazolam 2 MG Oral Tab          ALLERGIES:  Allergies   Allergen Reactions    Seasonal OTHER (SEE COMMENTS)     Sneezing and runny nose.        SOCIAL HISTORY:    Social History     Socioeconomic History    Marital status: Single   Tobacco Use    Smoking status: Former     Current packs/day: 0.50     Types: Cigarettes     Passive exposure: Past    Smokeless tobacco: Never   Vaping Use    Vaping status: Never Used   Substance and Sexual Activity    Alcohol use: No     Alcohol/week: 0.0 standard drinks of alcohol     Comment: rarely    Drug use: No     Comment: off drugs for 10 years   On disability   Smoking not now   Marijuana occ   Etoh rare  Drugs no  FAMILY HISTORY:   Family History   Problem Relation Age of Onset    Heart Disorder Father         CAD s/p stent 50s    Heart Attack Father     Anxiety Mother     Heart Attack Paternal Grandfather     No Known Problems Sister     No Known Problems Brother      MI in father in his late 50s   No prostate cancer   Aunt with breast cancer        REVIEW OF SYSTEMS:  All negative other than HPI    PHYSICAL EXAM:   Height: 6' 2\" (188 cm) (07/11 1523)  Weight: 369 lb 9.6 oz (167.6 kg) (07/11 1523)  BSA (Calculated - sq m): 2.82 sq meters (07/11 1523)  Pulse: 116 (07/11 1523)  BP: 118/70 (07/11 1523)  Temp: --  Do Not Use - Resp Rate: --  SpO2: 96 % (07/11 1523)    Body mass index is 47.45 kg/m².  Scar rt upper chest wall   Obese   No striae   CONSTITUTIONAL:  Awake and alert. Age appropriate, good hygiene not in acute distress. Well-nourished and well developed. no acute distress   PSYCH:   Orientated to time, place, person & situation, Normal mood and affect, memory intact, normal insight and judgment, cooperative  Neuro: speech is clear. Awake, alert, no aphasia, no facial asymmetry, no nuchal rigidity  EYES:  No proptosis, no ptosis, conjunctiva normal  ENT:  Normocephalic, atraumatic  Eye: EOMI, normal lids, no discharge, no conjunctival erythema. No exophthalmos/proptosis, Ptosis negative   No rhinorrhea, moist oral mucosa  Neck: full range of motion  Neck/Thyroid: neck inspection: normal, No scar, No  goiter   LUNGS:  No acute respiratory distress, non-labored respiration. Speaking full sentences  CARDIOVASCULAR:  regular rate   ABDOMEN:  No abdominal pain.   SKIN:  no bruising or bleeding, no rashes and no lesions, Skin is dry, no obvious rashes or lesions  EXTREMITIES: no gross abnormality   MSK: Moves extremities spontaneously. full range of motion in all major joints      DATA:     Pertinent data reviewed   CT ABD 5/2023 Left adrenal nodule, indeterminate. MRI is recommended for further characterization.    Latest Reference Range & Units 07/08/24 14:44   Potassium 3.5 - 5.1 mmol/L 4.5   Chloride 98 - 112 mmol/L 107   Carbon Dioxide, Total 21.0 - 32.0 mmol/L 30.0   BUN 9 - 23 mg/dL 13   CREATININE 0.70 - 1.30 mg/dL 1.47 (H)   (H): Data is abnormally high     No results for input(s): \"TSH\", \"T4F\", \"T3F\", \"THYP\" in the last 72 hours.  No results found.    Orders Placed This Encounter   Procedures    Basic Metabolic Panel (8)    Metanephrines, Plasma Free    Cortisol    ACTH, Plasma    Dehydroepiandrosterone Sulfate    Aldosterone, Serum    Renin, Plasma    Dexamethasone Suppression Test (Cortisol)     Orders Placed This Encounter    Basic Metabolic Panel (8)     Standing Status:   Future     Standing Expiration Date:   7/11/2025     Order Specific Question:   Release to patient     Answer:   Immediate    Metanephrines, Plasma Free     Standing Status:   Future     Standing Expiration Date:   7/11/2025    Cortisol     Standing Status:   Future     Standing Expiration Date:   7/11/2025     Order Specific Question:   Release to patient     Answer:   Immediate    ACTH, Plasma     Standing Status:   Future     Standing Expiration Date:   7/11/2025     Order Specific Question:   Release to patient     Answer:   Immediate    Dehydroepiandrosterone Sulfate     Standing Status:   Future     Standing Expiration Date:   7/11/2025     Order Specific Question:   Release to patient     Answer:   Immediate    Aldosterone,  Serum     Standing Status:   Future     Standing Expiration Date:   7/11/2025     Order Specific Question:   Release to patient     Answer:   Immediate    Renin, Plasma     Standing Status:   Future     Standing Expiration Date:   7/11/2025     Order Specific Question:   Release to patient     Answer:   Immediate    Dexamethasone Suppression Test (Cortisol)     Standing Status:   Future     Standing Expiration Date:   7/11/2025     Order Specific Question:   Release to patient     Answer:   Immediate    ergocalciferol 1.25 MG (12567 UT) Oral Cap     Sig: Take 1 capsule (50,000 Units total) by mouth once a week.    Omega-3 Fatty Acids (FISH OIL) 300 MG Oral Cap     Sig: Take by mouth.    dexamethasone 2 MG Oral Tab     Sig: Take 1 tablet (2 mg total) by mouth once for 1 dose. Take at midnight and do labs next day at 8 AM     Dispense:  1 tablet     Refill:  0          This is a specialized patient consultation in endocrinology and required comprehensive review of prior records, as well as current evaluation, with time required for consideration of complex endocrine issues and consultation. For this visit, I personally interviewed the patient, and family member if accompanied, performed the pertinent parts of the history and physical examination. ROS included screening for appropriate endocrine conditions.   Today's diagnosis and plan were reviewed in detail with the patient who states understanding and agrees with plan. I discussed with the patient possible diagnosis, differential diagnosis, need for work up, treatment options, alternatives and side effects.     Please see note for details about time spent which includes:   · pre-visit preparation  · reviewing records  · face to face time with the patient   · timely documentation of the encounter  · ordering medications/tests  · communication with care team  · care coordination    I appreciate the opportunity to be part of your patient's medical care and will keep  you, as the referring and primary physicians, informed about the care of your patient. Please feel free to contact me should you have any questions.    The 21st Century Cures Act makes medical notes like these available to patients in the interest of transparency. Please be advised this is a medical document. Medical documents are intended to carry relevant information, facts as evident, and the clinical opinion of the practitioner. The medical note is intended as peer to peer communication and may appear blunt or direct. It is written in medical language and may contain abbreviations or verbiage that are unfamiliar.   Kayce Patterson MD

## 2024-07-13 ENCOUNTER — LAB ENCOUNTER (OUTPATIENT)
Dept: LAB | Age: 39
End: 2024-07-13
Attending: INTERNAL MEDICINE
Payer: COMMERCIAL

## 2024-07-13 DIAGNOSIS — E27.8 LEFT ADRENAL MASS (HCC): ICD-10-CM

## 2024-07-13 DIAGNOSIS — E27.9 ADRENAL GLAND DISEASE (HCC): ICD-10-CM

## 2024-07-13 LAB
ANION GAP SERPL CALC-SCNC: 7 MMOL/L (ref 0–18)
BUN BLD-MCNC: 11 MG/DL (ref 9–23)
BUN/CREAT SERPL: 7.7 (ref 10–20)
CALCIUM BLD-MCNC: 9.6 MG/DL (ref 8.7–10.4)
CHLORIDE SERPL-SCNC: 105 MMOL/L (ref 98–112)
CO2 SERPL-SCNC: 28 MMOL/L (ref 21–32)
CORTIS SERPL-MCNC: 18.5 UG/DL
CORTIS SERPL-MCNC: 19.1 UG/DL
CREAT BLD-MCNC: 1.43 MG/DL
DHEA-S SERPL-MCNC: 127.6 UG/DL
EGFRCR SERPLBLD CKD-EPI 2021: 64 ML/MIN/1.73M2 (ref 60–?)
FASTING STATUS PATIENT QL REPORTED: YES
GLUCOSE BLD-MCNC: 90 MG/DL (ref 70–99)
OSMOLALITY SERPL CALC.SUM OF ELEC: 289 MOSM/KG (ref 275–295)
POTASSIUM SERPL-SCNC: 4.4 MMOL/L (ref 3.5–5.1)
SODIUM SERPL-SCNC: 140 MMOL/L (ref 136–145)

## 2024-07-13 PROCEDURE — 80048 BASIC METABOLIC PNL TOTAL CA: CPT

## 2024-07-13 PROCEDURE — 82088 ASSAY OF ALDOSTERONE: CPT

## 2024-07-13 PROCEDURE — 82533 TOTAL CORTISOL: CPT

## 2024-07-13 PROCEDURE — 82024 ASSAY OF ACTH: CPT

## 2024-07-13 PROCEDURE — 83835 ASSAY OF METANEPHRINES: CPT

## 2024-07-13 PROCEDURE — 36415 COLL VENOUS BLD VENIPUNCTURE: CPT

## 2024-07-13 PROCEDURE — 82627 DEHYDROEPIANDROSTERONE: CPT

## 2024-07-13 PROCEDURE — 84244 ASSAY OF RENIN: CPT

## 2024-07-14 ENCOUNTER — TELEPHONE (OUTPATIENT)
Dept: INTERNAL MEDICINE CLINIC | Facility: CLINIC | Age: 39
End: 2024-07-14

## 2024-07-14 DIAGNOSIS — R79.89 ELEVATED SERUM CREATININE: Primary | ICD-10-CM

## 2024-07-16 ENCOUNTER — LAB ENCOUNTER (OUTPATIENT)
Dept: LAB | Age: 39
End: 2024-07-16
Attending: INTERNAL MEDICINE
Payer: COMMERCIAL

## 2024-07-16 ENCOUNTER — TELEPHONE (OUTPATIENT)
Dept: ENDOCRINOLOGY CLINIC | Facility: CLINIC | Age: 39
End: 2024-07-16

## 2024-07-16 DIAGNOSIS — E27.9 ADRENAL GLAND DISEASE (HCC): ICD-10-CM

## 2024-07-16 DIAGNOSIS — E27.9 ADRENAL GLAND DISEASE (HCC): Primary | ICD-10-CM

## 2024-07-16 DIAGNOSIS — E27.8 LEFT ADRENAL MASS (HCC): ICD-10-CM

## 2024-07-16 LAB
ACTH: 20 PG/ML
CORTIS SERPL-MCNC: 14.9 UG/DL

## 2024-07-16 PROCEDURE — 36415 COLL VENOUS BLD VENIPUNCTURE: CPT

## 2024-07-16 PROCEDURE — 82533 TOTAL CORTISOL: CPT

## 2024-07-16 NOTE — TELEPHONE ENCOUNTER
Please check if dexamethasone suppression test was done correctly   If yes, we need salivary cortisol x3 at bedtime and 24 hr urine cortisol.   Please review with the pt how they are done thanks  SALIVARY CORTISOL    Please  the saliva collection kit from the lab. The instructions for the saliva collection will be provided to you. We need two separate salivary cortisol collections. You can do them 3 days apart.     - Do not brush teeth for 1 hour before collecting specimen.   -Do not eat or drink for 30 minutes prior to specimen collection.   -Do not drink alcohol or smoke 12 hours before  -Collect specimen at bedtime and record collection time.   -Do not apply any type of oral steroid creams as that can interfere with the test results.   -Using the [place] collection tube, remove the cotton roll & place under the tongue. Chew lightly until cotton roll is saturated with saliva. Return cotton roll to collection tube & cap. Label tube with name & collection time/date. Refrigerate tube until it can be mailed or taken to the lab      How to Collect the 24-hour Urine Specimen  Decide on a day to do the test.  On the day of the test, patient empty bladder (urinate or pee) in the toilet right after waking up. Flush the urine down the toilet.  The test begins now with the bladder empty. Write this date and the start time on the storage container’s label.  For the next 24 hours, patient will need to pee into a collection container every time they go to the bathroom. Females can use a toilet hat. Males can use a plastic urinal or pee right into the large storage container. If you do not have a toilet hat or urinal at home, you may use some other clean plastic container- or get them from labs   Before using the plastic container for the first time, wash it with dish soap and then rinse at least 10 times with tap water. Allow it to air dry completely.  Do not let feces (poop) mix with the urine or else the test will  need to be restarted.  Pour the urine into the large storage container and close the lid tightly. Be very careful not to spill any urine.  If using a collection container, rinse it with water only. Put it back by the toilet to remind you to use it the next time. Allow it to air dry completely.  Put the large storage container in the refrigerator ( or in put ice around the container and place in larger container). The urine must be kept cool at all times. If you do not have space in the refrigerator, you can store it in a cooler on top of Add more ice as needed to keep the urine cold.  Each time patient pees during the day and night, follow steps to collect urine.  The next day (close to the same time that you started on the first day), patient needs to pee into the collection container one last time. Add it to the large storage container. This ends the 24-hour collection.  Write the date and time of this last urine collection on the label.  Attach a list of all medicines, including over-the-counter medicines, vitamins and herbal remedies, patient took during the 24-hour urine collection.    Take the urine to a Laboratory (Lab) Service Center as soon as possible, within 24 hours after ending the collection. Keep the urine cool.  Make sure the urine does not freeze for these tests: amylase, arylsulfatase, immunoelectrophoresis, micro-albumin, pregnanetriol, protein or uric acid.  You will need to start the test over if any of the urine spilled, you forgot to save some or it has poop in it. If you must restart the test, it is okay to use the same collection and storage containers. Pour out the urine, clean the containers well and allow them to air dry. Then follow

## 2024-07-18 LAB — RENIN ACTIVITY: 2.08 NG/ML/HR

## 2024-07-18 NOTE — TELEPHONE ENCOUNTER
Called and spoke to patient, he reports he did the dexamethasone suppression test correctly.     Informed patient Dr. Patterson would like you to complete salivary cortisol x3 and 24 urine cortisol. Reviewed instructions with the patient as well as send them via my chart. Patient to  containers at the lab.    Patient understanding to all instructions provided.

## 2024-07-19 LAB
METANEPHRINE: <25 PG/ML
NORMETANEPHRINE: 69.2 PG/ML

## 2024-07-21 ENCOUNTER — LAB ENCOUNTER (OUTPATIENT)
Dept: LAB | Age: 39
End: 2024-07-21
Attending: INTERNAL MEDICINE
Payer: COMMERCIAL

## 2024-07-22 ENCOUNTER — LAB ENCOUNTER (OUTPATIENT)
Dept: LAB | Age: 39
End: 2024-07-22
Attending: INTERNAL MEDICINE
Payer: COMMERCIAL

## 2024-07-22 LAB — ALDOSTERONE: 20.6 NG/DL

## 2024-07-25 ENCOUNTER — LAB ENCOUNTER (OUTPATIENT)
Dept: LAB | Age: 39
End: 2024-07-25
Attending: INTERNAL MEDICINE
Payer: COMMERCIAL

## 2024-07-26 ENCOUNTER — LAB ENCOUNTER (OUTPATIENT)
Dept: LAB | Age: 39
End: 2024-07-26
Attending: INTERNAL MEDICINE
Payer: COMMERCIAL

## 2024-07-27 ENCOUNTER — LAB ENCOUNTER (OUTPATIENT)
Dept: LAB | Age: 39
End: 2024-07-27
Attending: INTERNAL MEDICINE
Payer: COMMERCIAL

## 2024-07-29 ENCOUNTER — TELEPHONE (OUTPATIENT)
Dept: INTERNAL MEDICINE CLINIC | Facility: CLINIC | Age: 39
End: 2024-07-29

## 2024-07-29 NOTE — TELEPHONE ENCOUNTER
Clarified with norma Christensen to add on Wednesday 07/31.  Patient notified and added to the schedule.

## 2024-07-29 NOTE — TELEPHONE ENCOUNTER
Verified name and .    Patient is calling to request an appointment with Dr. Yarbrough only.    He states that he has been seeing endocrinologist for for left adrenal mass and has upcoming appointment with nephrologist for elevated creatinine.    He states that he continues to have lower back pain and that due to the shayy issues, he has had worsened anxiety.    He states he does see a psychiatrist but that he would like to see Dr. Yarbrough to discuss his health issues and anxiety.    Patient is requesting that message be sent to Dr. Yarbrough to request to be added to the schedule. He states that he is available any day.

## 2024-07-30 ENCOUNTER — TELEPHONE (OUTPATIENT)
Dept: INTERNAL MEDICINE CLINIC | Facility: CLINIC | Age: 39
End: 2024-07-30

## 2024-07-30 NOTE — TELEPHONE ENCOUNTER
Called and spoke with patient who said he was able to get the early refill of #[phone] from his psychiatrist.  Feels OK presently.  FYI to Dr Yarbrough.    Future Appointments   Date Time Provider Department Center   8/23/2024 11:00 AM [facility] MRI RM1 (1.5T WIDE) [facility] MRI EDW [facility]   8/23/2024  1:00 PM [facility] US RM1 [facility] US W [facility]   8/29/2024  8:15 AM [facility]   8/30/2024  3:00 PM Dallas Warner MD [id number]

## 2024-07-30 NOTE — TELEPHONE ENCOUNTER
Spoke to patient. He said that he cannot get a hold of his psychiatrist and needs an early refill on Alprazolam 2 mg BID. His health concerns are causing him more anxiety and he needs to take more medication. RN did relay that really should be discussed with his psychiatry provider. He is coming to see Dr. Yarbrough tomorrow but is asking if he can refill this.     Rn asked for psychiatry information- Griffin Bobo [phone].[facility]- left message on nursing line to please reach out to patient to discuss his refill.     Dr. Yarbrough, please advise if you will authorize alprazolam script.

## 2024-07-30 NOTE — TELEPHONE ENCOUNTER
Pls ffup on this for pt if psych has given him early refil for his xanax.  I dont really feel comfortable refilling this med for pt since it's prescribed and managed by his psychiatrist so they should be managing it.

## 2024-07-31 ENCOUNTER — HOSPITAL ENCOUNTER (OUTPATIENT)
Dept: GENERAL RADIOLOGY | Age: 39
Discharge: HOME | End: 2024-07-31
Attending: INTERNAL MEDICINE
Payer: COMMERCIAL

## 2024-07-31 ENCOUNTER — OFFICE VISIT (OUTPATIENT)
Dept: INTERNAL MEDICINE CLINIC | Facility: CLINIC | Age: 39
End: 2024-07-31

## 2024-07-31 VITALS
TEMPERATURE: 99 F | HEIGHT: 74 IN | DIASTOLIC BLOOD PRESSURE: 89 MMHG | BODY MASS INDEX: 40.43 KG/M2 | SYSTOLIC BLOOD PRESSURE: 119 MMHG | OXYGEN SATURATION: 98 % | HEART RATE: 103 BPM | WEIGHT: 315 LBS

## 2024-07-31 DIAGNOSIS — G89.29 CHRONIC BILATERAL LOW BACK PAIN WITHOUT SCIATICA: ICD-10-CM

## 2024-07-31 DIAGNOSIS — M54.50 CHRONIC BILATERAL LOW BACK PAIN WITHOUT SCIATICA: ICD-10-CM

## 2024-07-31 DIAGNOSIS — F41.1 GAD (GENERALIZED ANXIETY DISORDER): ICD-10-CM

## 2024-07-31 DIAGNOSIS — G89.29 CHRONIC BILATERAL LOW BACK PAIN WITHOUT SCIATICA: Primary | ICD-10-CM

## 2024-07-31 DIAGNOSIS — M54.50 CHRONIC BILATERAL LOW BACK PAIN WITHOUT SCIATICA: Primary | ICD-10-CM

## 2024-07-31 PROCEDURE — 3079F DIAST BP 80-89 MM HG: CPT | Performed by: INTERNAL MEDICINE

## 2024-07-31 PROCEDURE — 3008F BODY MASS INDEX DOCD: CPT | Performed by: INTERNAL MEDICINE

## 2024-07-31 PROCEDURE — 3074F SYST BP LT 130 MM HG: CPT | Performed by: INTERNAL MEDICINE

## 2024-07-31 PROCEDURE — 72100 X-RAY EXAM L-S SPINE 2/3 VWS: CPT | Performed by: INTERNAL MEDICINE

## 2024-07-31 PROCEDURE — 99214 OFFICE O/P EST MOD 30 MIN: CPT | Performed by: INTERNAL MEDICINE

## 2024-07-31 RX ORDER — HYDROXYZINE PAMOATE 25 MG/1
CAPSULE ORAL
COMMUNITY

## 2024-07-31 RX ORDER — DEXAMETHASONE 2 MG/1
TABLET ORAL
COMMUNITY
Start: 2024-07-11

## 2024-07-31 RX ORDER — NAPROXEN 500 MG/1
TABLET ORAL
COMMUNITY
Start: 2024-05-30

## 2024-07-31 NOTE — PROGRESS NOTES
Subjective:     Patient ID: Jac Bess is a 39 year old male.    Low Back Pain  This is a chronic problem. The current episode started more than 1 month ago (4mos). The problem occurs constantly. The problem has been gradually worsening since onset. The pain is present in the lumbar spine. The quality of the pain is described as aching. The pain does not radiate. The pain is at a severity of 6/10. The pain is moderate. The pain is Worse during the day. The symptoms are aggravated by bending and twisting. Stiffness is present All day. Pertinent negatives include no bladder incontinence, bowel incontinence, dysuria, fever, numbness, paresis, paresthesias, perianal numbness or weakness. Risk factors include obesity. He has tried NSAIDs for the symptoms. The treatment provided no relief.       History/Other:   Review of Systems   Constitutional: Negative.  Negative for fever.   Gastrointestinal: Negative.  Negative for bowel incontinence.   Genitourinary: Negative.  Negative for bladder incontinence and dysuria.   Musculoskeletal:  Positive for back pain.   Neurological:  Negative for weakness, numbness and paresthesias.   Psychiatric/Behavioral:  The patient is nervous/anxious.      Current Outpatient Medications   Medication Sig Dispense Refill    ergocalciferol 1.25 MG (93030 UT) Oral Cap Take 1 capsule (50,000 Units total) by mouth once a week.      Omega-3 Fatty Acids (FISH OIL) 300 MG Oral Cap Take by mouth.      doxepin 25 MG Oral Cap Take 1 capsule (25 mg total) by mouth nightly as needed.      ELIQUIS 5 MG Oral Tab Take 1 tablet (5 mg total) by mouth 2 (two) times daily.      fenofibrate micronized 134 MG Oral Cap Take 1 capsule (134 mg total) by mouth daily with food.      busPIRone 15 MG Oral Tab       ARIpiprazole 5 MG Oral Tab Take 1 tablet (5 mg total) by mouth daily.      Venlafaxine HCl  MG Oral Capsule SR 24 Hr TK ONE C PO D  2    alprazolam 2 MG Oral Tab       dexamethasone 2 MG Oral Tab  TAKE 1 TABLET BY MOUTH FOR 1 DOSE. TAKE AT MIDNIGHT AND DO LABS NEXT DAY AT 8 AM (Patient not taking: Reported on 7/31/2024)      hydrOXYzine Pamoate 25 MG Oral Cap  (Patient not taking: Reported on 7/31/2024)      naproxen 500 MG Oral Tab TAKE 1 TABLET BY MOUTH EVERY 12 HOURS WITH FOOD OR MILK FOR 10 DAYS AS NEEDED (Patient not taking: Reported on 7/31/2024)      testosterone cypionate 200 mg/mL Intramuscular Solution ADMINISTER 1 ML IN THE MUSCLE EVERY WEEK AS DIRECTED (Patient not taking: Reported on 7/31/2024)      Neomycin-Polymyxin-HC 3.5-71251-2 Otic Suspension Place 4 drops into the left ear 4 (four) times daily. (Patient not taking: Reported on 4/30/2021) 1 Bottle 0     Allergies:  Allergies   Allergen Reactions    Seasonal OTHER (SEE COMMENTS)     Sneezing and runny nose.       Past Medical History:    Anxiety    Depression    DVT of lower extremity (deep venous thrombosis) (HCC)    left leg    Obesity    Panic disorder      Past Surgical History:   Procedure Laterality Date    Removal gallbladder      2020      Family History   Problem Relation Age of Onset    Heart Disorder Father         CAD s/p stent 50s    Heart Attack Father     Anxiety Mother     Heart Attack Paternal Grandfather     No Known Problems Sister     No Known Problems Brother       Social History:   Social History     Socioeconomic History    Marital status: Single   Tobacco Use    Smoking status: Former     Current packs/day: 0.50     Types: Cigarettes     Passive exposure: Past    Smokeless tobacco: Never   Vaping Use    Vaping status: Every Day   Substance and Sexual Activity    Alcohol use: No     Alcohol/week: 0.0 standard drinks of alcohol     Comment: rarely    Drug use: No     Comment: off drugs for 10 years        Objective:   Physical Exam  Constitutional:       General: He is not in acute distress.     Appearance: He is obese. He is not ill-appearing, toxic-appearing or diaphoretic.   Cardiovascular:      Rate and Rhythm:  Normal rate and regular rhythm.      Heart sounds: Normal heart sounds. No murmur heard.  Pulmonary:      Effort: Pulmonary effort is normal. No respiratory distress.      Breath sounds: Normal breath sounds. No stridor. No wheezing or rales.   Abdominal:      General: Bowel sounds are normal. There is no distension.      Palpations: Abdomen is soft.      Tenderness: There is no abdominal tenderness.   Musculoskeletal:      Cervical back: Normal range of motion and neck supple. No rigidity or tenderness.      Lumbar back: Tenderness and bony tenderness present. No swelling, edema, deformity or signs of trauma. Decreased range of motion. Negative right straight leg raise test and negative left straight leg raise test. No scoliosis.      Right lower leg: No edema.      Left lower leg: No edema.   Lymphadenopathy:      Cervical: No cervical adenopathy.   Skin:     Coloration: Skin is not jaundiced or pale.      Findings: No erythema or rash.   Neurological:      Mental Status: He is alert.      Sensory: Sensation is intact.      Motor: Motor function is intact. No weakness, tremor, atrophy or abnormal muscle tone.      Deep Tendon Reflexes: Babinski sign absent on the right side. Babinski sign absent on the left side.      Reflex Scores:       Patellar reflexes are 2+ on the right side and 2+ on the left side.       Achilles reflexes are 2+ on the right side and 2+ on the left side.        Assessment & Plan:   (M54.50,  G89.29) Chronic bilateral low back pain without sciatica  (primary encounter diagnosis)  Plan: XR LUMBAR SPINE (MIN 2 VIEWS) (CPT=72100),         Physical Therapy Referral - [facility]        Neuro exam no deficit noted, he has hx of DDD and lumbar spondylosis per pt. We will do get xray lumbar spine and erferred pt to PT.  Pt told not take nsaids due to his elevated creatinine before. He is on methadone  which should help manage his low back pain, may also use otc lidocaine patches. Pt told ot  call back if pain persist/worsens.     (F41.1) THALIA (generalized anxiety disorder)  Plan: [facility] Referral - In         Network, [facility] Referral - In Network        Pt had requested to see another psychiatrist and states not really satisfied with care/management from his current psychiatrist. We [place] [facility] [place] [facility]        No orders of the defined types were placed in this encounter.      Meds This Visit:  Requested Prescriptions      No prescriptions requested or ordered in this encounter       Imaging & Referrals:  None

## 2024-08-04 ENCOUNTER — TELEPHONE (OUTPATIENT)
Age: 39
End: 2024-08-04

## 2024-08-09 ENCOUNTER — LAB ENCOUNTER (OUTPATIENT)
Dept: LAB | Age: 39
End: 2024-08-09
Attending: INTERNAL MEDICINE
Payer: COMMERCIAL

## 2024-08-09 DIAGNOSIS — I82.422 THROMBOSIS OF LEFT ILIAC VEIN (HCC): Primary | ICD-10-CM

## 2024-08-09 LAB
INR BLD: 0.99 (ref 0.8–1.2)
PROTHROMBIN TIME: 13.6 SECONDS (ref 11.6–14.8)

## 2024-08-09 PROCEDURE — 85390 FIBRINOLYSINS SCREEN I&R: CPT

## 2024-08-09 PROCEDURE — 85306 CLOT INHIBIT PROT S FREE: CPT

## 2024-08-09 PROCEDURE — 85598 HEXAGNAL PHOSPH PLTLT NEUTRL: CPT

## 2024-08-09 PROCEDURE — 36415 COLL VENOUS BLD VENIPUNCTURE: CPT

## 2024-08-09 PROCEDURE — 85730 THROMBOPLASTIN TIME PARTIAL: CPT

## 2024-08-09 PROCEDURE — 85610 PROTHROMBIN TIME: CPT

## 2024-08-09 PROCEDURE — 85300 ANTITHROMBIN III ACTIVITY: CPT

## 2024-08-09 PROCEDURE — 81241 F5 GENE: CPT

## 2024-08-09 PROCEDURE — 85613 RUSSELL VIPER VENOM DILUTED: CPT

## 2024-08-09 PROCEDURE — 86147 CARDIOLIPIN ANTIBODY EA IG: CPT

## 2024-08-09 PROCEDURE — 86146 BETA-2 GLYCOPROTEIN ANTIBODY: CPT

## 2024-08-09 PROCEDURE — 85302 CLOT INHIBIT PROT C ANTIGEN: CPT

## 2024-08-11 ENCOUNTER — TELEPHONE (OUTPATIENT)
Age: 39
End: 2024-08-11

## 2024-08-12 LAB
B2 GLYCOPROT1 IGG SERPL IA-ACNC: 2.6 U/ML (ref ?–7)
B2 GLYCOPROT1 IGM SERPL IA-ACNC: <2.4 U/ML (ref ?–7)
CARDIOLIPIN IGG SERPL-ACNC: 4.5 GPL (ref ?–10)
CARDIOLIPIN IGM SERPL-ACNC: <0.9 MPL (ref ?–10)
F5 P.R506Q BLD/T QL: NORMAL
PROTEIN C AG: 116 %

## 2024-08-13 LAB
ANTITHROMBIN: 107 %
APTT PPP: 27.7 SECONDS (ref 23–36)
CONFIRM APTT STACLOT: NEGATIVE
CONFIRM DRVVT: 1.3 S (ref 0–1.1)
PROTEIN S FUNCTION: 105 %
PROTHROMBIN TIME: 13.8 SECONDS (ref 11.6–14.8)
SCREEN DRVVT: 1.1 S (ref 0–1.1)

## 2024-08-15 ENCOUNTER — PATIENT MESSAGE (OUTPATIENT)
Dept: INTERNAL MEDICINE CLINIC | Facility: CLINIC | Age: 39
End: 2024-08-15

## 2024-08-15 ENCOUNTER — TELEPHONE (OUTPATIENT)
Dept: INTERNAL MEDICINE CLINIC | Facility: CLINIC | Age: 39
End: 2024-08-15

## 2024-08-15 DIAGNOSIS — E27.9 ADRENAL NODULE (HCC): Primary | ICD-10-CM

## 2024-08-15 NOTE — TELEPHONE ENCOUNTER
I think he is already on alprazolam 2mg bid from his psychiatrist and this is what I usually give my patients when they go for mri so he can take 2mg a bout 15 min to 30min before his mri.   
Patient advised of Dr. Yarbrough's notes and verbalized understanding.  
Patient is requesting sedative  medication .  Hx anxiety , panic and extreme claustrophobia.  He is scheduled for  MRI on 8/23/24.   Preferred pharmacy verified.       Future Appointments   Date Time Provider Department Center   8/23/2024 11:00 AM [facility] MRI RM1 (1.5T WIDE) [facility] MRI EDW [facility]   8/23/2024  1:00 PM [facility]   8/27/2024  1:45 PM Mariana Tran PA-C [facility]   8/29/2024  8:15 AM Kayce Patterson MD [facility]GDGENDO [facility]   8/30/2024  3:00 PM Dallas Warner MD [id number]       
[see HPI] : see HPI

## 2024-08-16 NOTE — TELEPHONE ENCOUNTER
[Last office visit was on 7/31/24 with Dr. Yarbrough]    Dr. Yarbrough - please see [facility] message and advise    Future Appointments   Date Time Provider Department Center   8/23/2024 11:15 AM [facility] MRI RM1 (1.5T WIDE) [facility] MRI EDW [facility]

## 2024-08-17 NOTE — TELEPHONE ENCOUNTER
I think he should get be getting iv sedation for his mri since he has claustrophobia.   Can we order IV sedation with his MRI at [facility]. Thanks

## 2024-08-19 NOTE — TELEPHONE ENCOUNTER
Spoke with MRI at [facility].  They cannot perform sedation at the [place] location, a new order would need to be placed.  Patient's exam was ordered by Dr. Patterson.  Dr. Yarbrough, do you wish to place the new order or should patient contact specialist?

## 2024-08-20 ENCOUNTER — TELEPHONE (OUTPATIENT)
Dept: ENDOCRINOLOGY CLINIC | Facility: CLINIC | Age: 39
End: 2024-08-20

## 2024-08-20 DIAGNOSIS — E27.8 LEFT ADRENAL MASS (HCC): Primary | ICD-10-CM

## 2024-08-20 NOTE — TELEPHONE ENCOUNTER
Patient called back.  Currently re-scheduled for 09/27 for MRI with sedation.  Dr. Yarbrough, are you able to use office visit from 07/31 for clearance or will the patient need to be seen again?

## 2024-08-20 NOTE — TELEPHONE ENCOUNTER
Ok to take small dose of Lorazepam 30 minutes prior but he needs a ride , do not drive after taking Lorazepam  Thanks

## 2024-08-20 NOTE — TELEPHONE ENCOUNTER
Spoke with pt. States it was recommended by his PCP to have IV sedation for MRI d/t anxiety. States that he is unable to have MRI on 8/23 because the facility he was going to have MRI done at is unable to perform IV sedation.    Facility stated that pt is to have new order for MRI stating IV sedation required. Pt states once he has this order, he will schedule MRI at the hospital.    Dr Patterson,   please review pended order. Pt does have elevated creatinine, 1.43 on 7/13.    Thank you!

## 2024-08-20 NOTE — TELEPHONE ENCOUNTER
Dr Patterson,    Patient has claustrophobia and needs new order for MRI with IV sedation.    Order pended, please sign if appropriate.    Thanks

## 2024-08-20 NOTE — TELEPHONE ENCOUNTER
Dr Luke Yarbrough,    Dr Patterson is recommending Alprazolam 0.5 mg before MRI. Patient notified.     Can you send order for MRI with IV sedation?            Thank you.

## 2024-08-20 NOTE — TELEPHONE ENCOUNTER
Patient contacted and notified of limitations and changes necessary to his MRI order.  He will contact Dr. Patterson for further advice.  Message routed to clinical staff.

## 2024-08-20 NOTE — TELEPHONE ENCOUNTER
Patient calling states needs to under go IV sedation and unsure if needs new orders for MRI and ultrasound. Please call, patient is scheduled for 8/23/24.

## 2024-08-24 ENCOUNTER — NURSE TRIAGE (OUTPATIENT)
Dept: INTERNAL MEDICINE CLINIC | Facility: CLINIC | Age: 39
End: 2024-08-24

## 2024-08-24 NOTE — TELEPHONE ENCOUNTER
Action Requested: Summary for Provider     []  Critical Lab, Recommendations Needed  [] Need Additional Advice  [x]   FYI    []   Need Orders  [] Need Medications Sent to Pharmacy  []  Other     SUMMARY: Going to [facility]     Reason for call: Acute  Onset: Today     Rn called patient. Patient's date of birth and full name both confirmed.   Audible shortness of breath.   Chest pain  Woke up feeling disoriented.   Reports anxiety.   Has new psychiatrist appointment on Wednesday.   Patient concerned about withdrawal from alprazolam.   Out of alprazolam now, last dose 5:00pm yesterday 8/23/24.   Usually takes Alprazolam 2mg, twice daily     Triaged and advised care advice   Denies suicidal ideations and behaviors.   Denies self injury ideations and behaviors.     Someone is with him. RN can hear femaile     Evaluation advised Emergency Room now.   RN offered to call 911. Declines.   The person with him will take patient to [facility] Room now, per patient that is 5 minutes away.     Advised to monitor symptoms.  RN advised if symptoms get severely worse, call 911.   Patient verbalizes understanding and is agreeable to instructions.

## 2024-08-24 NOTE — TELEPHONE ENCOUNTER
Name:SATURDAY TRIAGE RN                      2024 10:26:00 AM  ProfileId:    [id number]                   [facility]  Department:[facility]  ======================================================================  Text Messages    Message # [id number]         2024 10:23a   [MONICAG]  To:  SATURDAY TRIAGE RN  From:  KELLY Corral MD:  DR SANTOS  Phone#:  [phone]  ----------------------------------------------------------------------  RE PT HAVING ANXIETY , STATES NEEDS MEDICATION REFILL. DOESNT WANT TO GO TO ER DEPT.  86        (Message Is Not Delivered)  ======================================================================    Reason for Disposition  • Difficulty breathing and persists > 10 minutes and not relieved by reassurance provided by triager    Protocols used: Anxiety and Panic Attack-A-OH

## 2024-08-26 NOTE — TELEPHONE ENCOUNTER
Pre-op scheduled for 9/16 at 8:00 AM, 30 minutes created overbooking, do you want appointment time changed to 15 minutes, please advise.    Spoke to patient, full name and date of birth verified.   Appointment scheduled for 9/16/24 at 8:00 AM.

## 2024-08-29 ENCOUNTER — OFFICE VISIT (OUTPATIENT)
Facility: LOCATION | Age: 39
End: 2024-08-29
Payer: COMMERCIAL

## 2024-08-29 VITALS
DIASTOLIC BLOOD PRESSURE: 66 MMHG | OXYGEN SATURATION: 97 % | HEIGHT: 74 IN | BODY MASS INDEX: 40.43 KG/M2 | HEART RATE: 112 BPM | SYSTOLIC BLOOD PRESSURE: 108 MMHG | WEIGHT: 315 LBS

## 2024-08-29 DIAGNOSIS — G89.29 CHRONIC BACK PAIN, UNSPECIFIED BACK LOCATION, UNSPECIFIED BACK PAIN LATERALITY: ICD-10-CM

## 2024-08-29 DIAGNOSIS — F41.9 ANXIETY AND DEPRESSION: ICD-10-CM

## 2024-08-29 DIAGNOSIS — E66.01 CLASS 3 SEVERE OBESITY WITH BODY MASS INDEX (BMI) OF 45.0 TO 49.9 IN ADULT, UNSPECIFIED OBESITY TYPE, UNSPECIFIED WHETHER SERIOUS COMORBIDITY PRESENT (HCC): ICD-10-CM

## 2024-08-29 DIAGNOSIS — E27.9 ADRENAL GLAND DISEASE (HCC): ICD-10-CM

## 2024-08-29 DIAGNOSIS — R79.89 LOW TESTOSTERONE: ICD-10-CM

## 2024-08-29 DIAGNOSIS — E27.8 LEFT ADRENAL MASS (HCC): Primary | ICD-10-CM

## 2024-08-29 DIAGNOSIS — M54.9 CHRONIC BACK PAIN, UNSPECIFIED BACK LOCATION, UNSPECIFIED BACK PAIN LATERALITY: ICD-10-CM

## 2024-08-29 DIAGNOSIS — Z79.01 BLOOD THINNED DUE TO LONG-TERM ANTICOAGULANT USE: ICD-10-CM

## 2024-08-29 DIAGNOSIS — F32.A ANXIETY AND DEPRESSION: ICD-10-CM

## 2024-08-29 DIAGNOSIS — Z86.718 HISTORY OF DVT (DEEP VEIN THROMBOSIS): ICD-10-CM

## 2024-08-29 DIAGNOSIS — F41.9 ANXIETY: ICD-10-CM

## 2024-08-29 DIAGNOSIS — M54.2 NECK PAIN: ICD-10-CM

## 2024-08-29 PROCEDURE — 3008F BODY MASS INDEX DOCD: CPT | Performed by: INTERNAL MEDICINE

## 2024-08-29 PROCEDURE — 3078F DIAST BP <80 MM HG: CPT | Performed by: INTERNAL MEDICINE

## 2024-08-29 PROCEDURE — 99214 OFFICE O/P EST MOD 30 MIN: CPT | Performed by: INTERNAL MEDICINE

## 2024-08-29 PROCEDURE — 3074F SYST BP LT 130 MM HG: CPT | Performed by: INTERNAL MEDICINE

## 2024-08-29 NOTE — PATIENT INSTRUCTIONS
Fasting AM labs to check testosterone in late Oct and RTC after that   MRI soon   Follow up with nephrology

## 2024-08-29 NOTE — PROGRESS NOTES
Reason for Visit:   low testosterone , adrenal nodule      Requesting Physician:   ..Luke Yarbrough MD    CHIEF COMPLAINT:    Chief Complaint   Patient presents with    Hypogonadism    Adrenal Problem     Follow-up on left adrenal mass labs done 8/9/24        HISTORY OF PRESENT ILLNESS:   Jac Bess is a 39 year old male who presents with  low testosterone on testosterone IM, Lt adrenal nodule on CT in 2023, DVT on blood thinner,  trying to conceive, chronic pain, methadone  use for ~ 10 yrs, obese     Was seen with his wife who is a nurse.   Had labs  We discussed result   Normal NMN/MN, brian /Renin, DHEAs   High DST so we did salivary and urinary free cortisol, which both were normal  24 hr urine creat high so will get US kidney and see nephrology   Still pending MRI adrenal but will get it with sedation from PCP.       Has been on pain med for ~ 10 yrs. He had low enrgy.  Pain med doctor ordered testosterone and was started on tx in 2023.   He takes testo CYP.  200mg/IM 1 ML weekly   Started in 2023.   Had semen analysis which showed no sperm per Pt. Stopped testo 7/2024.   Had DVT and was started on blood thinner     They are trying to conceive now and home test for sperm count which was < 25K   Seeing fertility specialist now  and might see urology.      Puberty: unremarkable   He does not have  biological children.   Steroid: No   Anabolic steroids: No   Previous testosterone or \"supplements\" :  mg/week started 2023   Head trauma: No   Infection (mumps) or trauma to the testicles: No   Neck/back pain on methadone      CT ABD 5/2023 Left adrenal nodule, indeterminate. MRI is recommended for further characterization.   Gets chest pain /tightness but has THALIA /MDD  No striae   Has panic disorder   On psych meds   No HTN or spontaneous hypokalemia   Wt Readings from Last 6 Encounters:   08/29/24 (!) 352 lb (159.7 kg)   07/31/24 (!) 350 lb 12.8 oz (159.1 kg)   07/11/24 (!) 369 lb 9.6 oz (167.6  kg)   06/26/24 (!) 368 lb 8 oz (167.2 kg)   04/30/21 (!) 345 lb (156.5 kg)   03/31/20 (!) 345 lb (156.5 kg)           ASSESSMENT AND PLAN:  39 year old man who presents with  low testosterone on testosterone IM, Lt adrenal nodule on CT in 2023, DVT on blood thinner,  trying to conceive, chronic pain, methadone  use for ~ 10 yrs, obese   Discussed with pt and his wife in length   They understand the need to stop testosterone for etiology w/u and for spermatogenesis.   He has been on methadone for yrs which can be the etiology for hypogonadism and his symptoms    Lt adrenal on CT in 2023.   Biochemical w/u is negative for hyperaldo, cushing or pheo.   7/2024 Normal NMN/MN, brian /Renin, DHEAs. High DST so we did salivary and urinary free cortisol, which both were normal  24 hr urine creat high so will get US kidney and see nephrology   He has nonspecific symptoms.   pending MRI   7/2024 Stopped testosterone  Lost 20 lbs   Plan  MRI with adrenal protocol   Fasting AM labs to check testosterone in late Oct and RTC after that   MRI soon   Follow up with nephrology      Will reassess hypogonadism in next visit     MRI 9/27/2024 will do it with IV sedation from PCP   Will have a consult with nephrology 10/4/2024  US kidney on 9/11/2024        PAST MEDICAL HISTORY:   Past Medical History:    Anxiety    Depression    DVT of lower extremity (deep venous thrombosis) (HCC)    left leg    Obesity    Panic disorder   Chronic pain  Methadone use   Low testosterone   Low sperm count   THALIA/MDD     PAST SURGICAL HISTORY:   Past Surgical History:   Procedure Laterality Date    Removal gallbladder      2020       CURRENT MEDICATIONS:     ergocalciferol 1.25 MG (27966 UT) Oral Cap Take 1 capsule (50,000 Units total) by mouth once a week.      Omega-3 Fatty Acids (FISH OIL) 300 MG Oral Cap Take by mouth.      doxepin 25 MG Oral Cap Take 1 capsule (25 mg total) by mouth nightly as needed.      fenofibrate micronized 134 MG Oral Cap Take 1  capsule (134 mg total) by mouth daily with food.      busPIRone 15 MG Oral Tab       ARIpiprazole 5 MG Oral Tab Take 1 tablet (5 mg total) by mouth daily.      Venlafaxine HCl  MG Oral Capsule SR 24 Hr TK ONE C PO D  2    alprazolam 2 MG Oral Tab          ALLERGIES:  Allergies   Allergen Reactions    Seasonal OTHER (SEE COMMENTS)     Sneezing and runny nose.       SOCIAL HISTORY:    Social History     Socioeconomic History    Marital status: Single   Tobacco Use    Smoking status: Former     Current packs/day: 0.50     Types: Cigarettes     Passive exposure: Past    Smokeless tobacco: Never   Vaping Use    Vaping status: Every Day   Substance and Sexual Activity    Alcohol use: No     Alcohol/week: 0.0 standard drinks of alcohol     Comment: rarely    Drug use: No     Comment: off drugs for 10 years   On disability   Smoking not now   Marijuana occ   Etoh rare  Drugs no  FAMILY HISTORY:   Family History   Problem Relation Age of Onset    Heart Disorder Father         CAD s/p stent 50s    Heart Attack Father     Anxiety Mother     Heart Attack Paternal Grandfather     No Known Problems Sister     No Known Problems Brother      MI in father in his late 50s   No prostate cancer   Aunt with breast cancer     PHYSICAL EXAM:   Height: 6' 2\" (188 cm) (08/29 0816)  Weight: 352 lb (159.7 kg) (08/29 0816)  BSA (Calculated - sq m): 2.76 sq meters (08/29 0816)  Pulse: 112 (08/29 0816)  BP: 108/66 (08/29 0816)  Temp: --  Do Not Use - Resp Rate: --  SpO2: 97 % (08/29 0816)    Body mass index is 45.19 kg/m².     Obese      CONSTITUTIONAL:  Awake and alert. Age appropriate, good hygiene not in acute distress. Well-nourished and well developed. no acute distress   PSYCH:   Orientated to time, place, person & situation, Normal mood and affect, memory intact, normal insight and judgment, cooperative  Neuro: speech is clear. Awake, alert, no aphasia, no facial asymmetry, no nuchal rigidity  EYES:  No proptosis, no ptosis,  conjunctiva normal  ENT:  Normocephalic, atraumatic  Eye: EOMI, normal lids, no discharge, no conjunctival erythema. No exophthalmos/proptosis, Ptosis negative   No rhinorrhea, moist oral mucosa  Neck: full range of motion         DATA:     Pertinent data reviewed   CT ABD 5/2023 Left adrenal nodule, indeterminate. MRI is recommended for further characterization.    Latest Reference Range & Units 07/13/24 09:37   Cortisol ug/dL 19.1   RENIN ACTIVITY 0.167 - 5.380 ng/mL/hr 2.079   ACTH 7.2 - 63.3 pg/mL 20.0   ALDOSTERONE 0.0 - 30.0 ng/dL 20.6      Latest Reference Range & Units 07/25/24 21:00 07/26/24 21:00 07/27/24 21:00 08/09/24 12:46   Anti-Thrombin III 80 - 120 %    107   Factor V Leiden Normal     Normal   Protein S Function 70 - 140 %    105   Salivary Cortisol MS ug/dL 0.035 0.044 0.099       Latest Reference Range & Units 07/13/24 09:37 07/16/24 08:02 07/22/24 07:00   Cortisol Free 24h Ur 5 - 64 ug/24 hr   20   Cortisol Free Ur Undefined ug/L   7   Creat 24h Ur 1000 - 2000 mg/24 hr   3442 (H)   Dexa Suppress Cortisol ug/dL 18.5 14.9    DHEA SULFATE 34.5 - 568.9 ug/dL 127.6     (H): Data is abnormally high   Latest Reference Range & Units 07/08/24 14:44   Potassium 3.5 - 5.1 mmol/L 4.5   Chloride 98 - 112 mmol/L 107   Carbon Dioxide, Total 21.0 - 32.0 mmol/L 30.0   BUN 9 - 23 mg/dL 13   CREATININE 0.70 - 1.30 mg/dL 1.47 (H)   (H): Data is abnormally high     No results for input(s): \"TSH\", \"T4F\", \"T3F\", \"THYP\" in the last 72 hours.  No results found.    Orders Placed This Encounter   Procedures    Testosterone,Free And Total    FSH    LH (Luteinizing Hormone)    Prolactin     Orders Placed This Encounter    Testosterone,Free And Total     Standing Status:   Future     Standing Expiration Date:   8/29/2025     Order Specific Question:   Release to patient     Answer:   Immediate    FSH     Standing Status:   Future     Standing Expiration Date:   8/29/2025     Order Specific Question:   Release to patient      Answer:   Immediate    LH (Luteinizing Hormone)     Standing Status:   Future     Standing Expiration Date:   8/29/2025     Order Specific Question:   Release to patient     Answer:   Immediate    Prolactin     Standing Status:   Future     Standing Expiration Date:   8/29/2025     Order Specific Question:   Release to patient     Answer:   Immediate          This is a specialized patient consultation in endocrinology and required comprehensive review of prior records, as well as current evaluation, with time required for consideration of complex endocrine issues and consultation. For this visit, I personally interviewed the patient, and family member if accompanied, performed the pertinent parts of the history and physical examination. ROS included screening for appropriate endocrine conditions.   Today's diagnosis and plan were reviewed in detail with the patient who states understanding and agrees with plan. I discussed with the patient possible diagnosis, differential diagnosis, need for work up, treatment options, alternatives and side effects.     Please see note for details about time spent which includes:   · pre-visit preparation  · reviewing records  · face to face time with the patient   · timely documentation of the encounter  · ordering medications/tests  · communication with care team  · care coordination    I appreciate the opportunity to be part of your patient's medical care and will keep you, as the referring and primary physicians, informed about the care of your patient. Please feel free to contact me should you have any questions.    The 21st Century Cures Act makes medical notes like these available to patients in the interest of transparency. Please be advised this is a medical document. Medical documents are intended to carry relevant information, facts as evident, and the clinical opinion of the practitioner. The medical note is intended as peer to peer communication and may appear blunt or  direct. It is written in medical language and may contain abbreviations or verbiage that are unfamiliar.   Kayce Patterson MD

## 2024-09-11 ENCOUNTER — HOSPITAL ENCOUNTER (OUTPATIENT)
Dept: ULTRASOUND IMAGING | Age: 39
Discharge: HOME | End: 2024-09-11
Attending: INTERNAL MEDICINE
Payer: COMMERCIAL

## 2024-09-11 DIAGNOSIS — R79.89 ELEVATED SERUM CREATININE: ICD-10-CM

## 2024-09-11 PROCEDURE — 76770 US EXAM ABDO BACK WALL COMP: CPT | Performed by: INTERNAL MEDICINE

## 2024-09-12 VITALS — BODY MASS INDEX: 40.43 KG/M2 | HEIGHT: 74 IN | WEIGHT: 315 LBS

## 2024-09-12 RX ORDER — SODIUM CHLORIDE 9 MG/ML
INJECTION, SOLUTION INTRAVENOUS CONTINUOUS
OUTPATIENT
Start: 2024-09-12

## 2024-09-16 ENCOUNTER — OFFICE VISIT (OUTPATIENT)
Dept: INTERNAL MEDICINE CLINIC | Facility: CLINIC | Age: 39
End: 2024-09-16
Payer: COMMERCIAL

## 2024-09-16 VITALS
HEART RATE: 100 BPM | BODY MASS INDEX: 47 KG/M2 | SYSTOLIC BLOOD PRESSURE: 114 MMHG | OXYGEN SATURATION: 98 % | WEIGHT: 315 LBS | DIASTOLIC BLOOD PRESSURE: 70 MMHG

## 2024-09-16 DIAGNOSIS — E27.9 ADRENAL NODULE (HCC): ICD-10-CM

## 2024-09-16 DIAGNOSIS — Z01.818 PREOP EXAMINATION: Primary | ICD-10-CM

## 2024-09-16 DIAGNOSIS — M54.50 CHRONIC BILATERAL LOW BACK PAIN WITHOUT SCIATICA: ICD-10-CM

## 2024-09-16 DIAGNOSIS — G89.29 CHRONIC BILATERAL LOW BACK PAIN WITHOUT SCIATICA: ICD-10-CM

## 2024-09-16 DIAGNOSIS — F41.1 GAD (GENERALIZED ANXIETY DISORDER): ICD-10-CM

## 2024-09-16 DIAGNOSIS — Z86.718 HISTORY OF DEEP VEIN THROMBOSIS (DVT) OF LOWER EXTREMITY: ICD-10-CM

## 2024-09-16 DIAGNOSIS — E29.1 HYPOGONADISM, MALE: ICD-10-CM

## 2024-09-16 PROCEDURE — 3074F SYST BP LT 130 MM HG: CPT | Performed by: INTERNAL MEDICINE

## 2024-09-16 PROCEDURE — 3078F DIAST BP <80 MM HG: CPT | Performed by: INTERNAL MEDICINE

## 2024-09-16 PROCEDURE — 99214 OFFICE O/P EST MOD 30 MIN: CPT | Performed by: INTERNAL MEDICINE

## 2024-09-16 NOTE — PROGRESS NOTES
Subjective:     Patient ID: Jac Bess is a 39 year old male.    Patient presents today for history and physical for upcoming MRI of adrenal gland ordered by his endo for adrenal nodule. Pt has claustrophobia and needs IV sedation for his mri to be done.         History/Other:   Review of Systems   Constitutional: Negative.    Respiratory: Negative.     Cardiovascular: Negative.    Gastrointestinal: Negative.    Genitourinary: Negative.    Psychiatric/Behavioral:  Negative for self-injury, sleep disturbance and suicidal ideas. The patient is nervous/anxious.      Current Outpatient Medications   Medication Sig Dispense Refill    ergocalciferol 1.25 MG (85870 UT) Oral Cap Take 1 capsule (50,000 Units total) by mouth once a week.      Omega-3 Fatty Acids (FISH OIL) 300 MG Oral Cap Take by mouth.      doxepin 25 MG Oral Cap Take 1 capsule (25 mg total) by mouth nightly as needed.      fenofibrate micronized 134 MG Oral Cap Take 1 capsule (134 mg total) by mouth daily with food.      busPIRone 15 MG Oral Tab       ARIpiprazole 5 MG Oral Tab Take 1 tablet (5 mg total) by mouth daily.      Venlafaxine HCl  MG Oral Capsule SR 24 Hr TK ONE C PO D  2    alprazolam 2 MG Oral Tab       testosterone cypionate 200 mg/mL Intramuscular Solution  (Patient not taking: Reported on 9/16/2024)       Allergies:  Allergies   Allergen Reactions    Seasonal OTHER (SEE COMMENTS)     Sneezing and runny nose.       Past Medical History:    Anxiety state    and panic disorder    Back problem    Depression    Disorder of liver    fatty liver    DVT of lower extremity (deep venous thrombosis) (HCC)    left leg    Migraines    Obesity    Panic disorder      Past Surgical History:   Procedure Laterality Date    Removal gallbladder      2020      Family History   Problem Relation Age of Onset    Heart Disorder Father         CAD s/p stent 50s    Heart Attack Father     Anxiety Mother     Heart Attack Paternal Grandfather     No Known  Problems Sister     No Known Problems Brother       Social History:   Social History     Socioeconomic History    Marital status: Single   Tobacco Use    Smoking status: Former     Current packs/day: 0.50     Average packs/day: 0.5 packs/day for 2.7 years (1.4 ttl pk-yrs)     Types: Cigarettes     Start date: 1/1/2022     Passive exposure: Past    Smokeless tobacco: Never   Vaping Use    Vaping status: Every Day    Substances: Nicotine, Flavoring    Devices: Disposable   Substance and Sexual Activity    Alcohol use: No     Alcohol/week: 0.0 standard drinks of alcohol     Comment: rarely    Drug use: No     Comment: off drugs for 10 years        Objective:   Physical Exam  Constitutional:       General: He is not in acute distress.     Appearance: He is well-developed. He is obese. He is not ill-appearing, toxic-appearing or diaphoretic.   HENT:      Head: Normocephalic and atraumatic.      Right Ear: Tympanic membrane, ear canal and external ear normal.      Left Ear: Tympanic membrane, ear canal and external ear normal.      Nose: Nose normal.      Mouth/Throat:      Pharynx: No oropharyngeal exudate.   Eyes:      General: No scleral icterus.        Right eye: No discharge.         Left eye: No discharge.      Conjunctiva/sclera: Conjunctivae normal.      Pupils: Pupils are equal, round, and reactive to light.   Neck:      Thyroid: No thyromegaly.      Vascular: No JVD.   Cardiovascular:      Rate and Rhythm: Normal rate and regular rhythm.      Pulses: Normal pulses.      Heart sounds: Normal heart sounds. No murmur heard.     No friction rub. No gallop.   Pulmonary:      Effort: Pulmonary effort is normal. No respiratory distress.      Breath sounds: Normal breath sounds. No wheezing or rales.   Abdominal:      General: Abdomen is flat. Bowel sounds are normal. There is no distension.      Palpations: Abdomen is soft. There is no mass.      Tenderness: There is no abdominal tenderness. There is no guarding or  rebound.   Musculoskeletal:         General: Normal range of motion.      Cervical back: Normal range of motion and neck supple. No rigidity or tenderness.      Right lower leg: No edema.      Left lower leg: No edema.   Lymphadenopathy:      Cervical: No cervical adenopathy.   Skin:     General: Skin is warm and dry.      Coloration: Skin is not jaundiced or pale.      Findings: No rash.   Neurological:      Mental Status: He is alert and oriented to person, place, and time.   Psychiatric:         Attention and Perception: Attention normal.         Mood and Affect: Mood is anxious.         Speech: Speech normal.         Behavior: Behavior normal. Behavior is cooperative.         Thought Content: Thought content normal.         Cognition and Memory: Cognition normal.         Assessment & Plan:   (Z01.818) Preop examination  (primary encounter diagnosis)  Plan: Patient seen and examined.  Will need IV sedation for his MRI of adrenal gland given his anxiety as well as claustrophobia.  He may proceed with this procedure as planned.        (F41.1) THALIA (generalized anxiety disorder)  Plan: Patient ready seeing psychiatrist and is on antianxiety medication.  He will need to be on IV sedation for his MRI because of his anxiety and claustrophobia.  He may go ahead and proceed with his procedure as scheduled.    (E29.1) Hypogonadism, male  Plan: This is likely related to his methadone treatment before and not related to testosterone deficiency.  His testosterone medication  discontinued already by his endocrinologist.    (E27.9) Adrenal nodule (HCC)  Plan: Patient evaluated ready by endocrinologist and will be having an MRI of his adrenal gland.    (Z86.688) History of deep vein thrombosis (DVT) of lower extremity  Plan: Patient ready had completed 3 months of anticoagulation was discontinued ready by his hematologist.  He does not have any hypercoagulable state and his DVT was provoked with being on testosterone therapy  before which already been discontinued.       No orders of the defined types were placed in this encounter.      Meds This Visit:  Requested Prescriptions      No prescriptions requested or ordered in this encounter       Imaging & Referrals:  None

## 2024-09-17 ENCOUNTER — TELEPHONE (OUTPATIENT)
Dept: INTERNAL MEDICINE CLINIC | Facility: CLINIC | Age: 39
End: 2024-09-17

## 2024-09-17 NOTE — TELEPHONE ENCOUNTER
Medical clearance faxed to [facility] at [phone], confirmation received.

## 2024-09-25 ENCOUNTER — MED REC SCAN ONLY (OUTPATIENT)
Dept: INTERNAL MEDICINE CLINIC | Facility: CLINIC | Age: 39
End: 2024-09-25

## 2024-09-26 ENCOUNTER — ANESTHESIA EVENT (OUTPATIENT)
Dept: MRI IMAGING | Facility: HOSPITAL | Age: 39
End: 2024-09-26
Payer: COMMERCIAL

## 2024-09-27 ENCOUNTER — ANESTHESIA (OUTPATIENT)
Dept: MRI IMAGING | Facility: HOSPITAL | Age: 39
End: 2024-09-27
Payer: COMMERCIAL

## 2024-09-27 ENCOUNTER — HOSPITAL ENCOUNTER (OUTPATIENT)
Dept: MRI IMAGING | Facility: HOSPITAL | Age: 39
Discharge: HOME | End: 2024-09-27
Attending: INTERNAL MEDICINE
Payer: COMMERCIAL

## 2024-09-27 NOTE — ANESTHESIA PREPROCEDURE EVALUATION
PRE-OP EVALUATION    Patient Name: Jac Bess    Admit Diagnosis: Adrenal nodule (HCC) [E27.9]    Pre-op Diagnosis: * No surgery found *        Anesthesia Procedure: MRI ABDOMEN (W+WO) (CPT=74183)    * Surgery not found *    Pre-op vitals reviewed.        Body mass index is 44.94 kg/m².    Current medications reviewed.  Hospital Medications:  No current facility-administered medications on file as of 9/27/2024.       Outpatient Medications:   (Not in a hospital admission)      Allergies: Seasonal      Anesthesia Evaluation    Patient summary reviewed.    Anesthetic Complications           GI/Hepatic/Renal                (+) liver disease                 Cardiovascular        Exercise tolerance: good     MET: <=4    (+) obesity                                       Endo/Other                                  Pulmonary    Negative pulmonary ROS.                       Neuro/Psych  Comment: On methadone    (+) depression  (+) anxiety                              Past Surgical History:   Procedure Laterality Date    Removal gallbladder      2020     Social History     Socioeconomic History    Marital status: Single   Tobacco Use    Smoking status: Former     Current packs/day: 0.50     Average packs/day: 0.5 packs/day for 2.7 years (1.4 ttl pk-yrs)     Types: Cigarettes     Start date: 1/1/2022     Passive exposure: Past    Smokeless tobacco: Never   Vaping Use    Vaping status: Every Day    Substances: Nicotine, Flavoring    Devices: Disposable   Substance and Sexual Activity    Alcohol use: No     Alcohol/week: 0.0 standard drinks of alcohol     Comment: rarely    Drug use: No     Comment: off drugs for 10 years     History   Drug Use No     Comment: off drugs for 10 years     Available pre-op labs reviewed.     Lab Results   Component Value Date     07/13/2024    K 4.4 07/13/2024     07/13/2024    CO2 28.0 07/13/2024    BUN 11 07/13/2024    CREATSERUM 1.43 (H) 07/13/2024    GLU 90 07/13/2024    CA  9.6 07/13/2024            Airway  Comment: beard    Mallampati: III  Mouth opening: >3 FB    Neck ROM: full Cardiovascular      Rhythm: regular  Rate: normal     Dental    Dentition appears grossly intact         Pulmonary      Breath sounds clear to auscultation bilaterally.               Other findings              ASA: 3   Plan: general  NPO status verified and patient meets guidelines.    Post-procedure pain management plan discussed with surgeon and patient.      Plan/risks discussed with: patient (Risks of general anesthesia discussed with patient, including nausea/vomiting, sore throat, dental injury, aspiration, allergic reaction, and cardiopulmonary compromise. All of their questions were answered and they are in agreement with the plan.)                Present on Admission:  **None**

## 2024-10-08 RX ORDER — METHADONE HYDROCHLORIDE 5 MG/1
145 TABLET ORAL EVERY MORNING
COMMUNITY

## 2024-10-09 ENCOUNTER — HOSPITAL ENCOUNTER (OUTPATIENT)
Dept: MRI IMAGING | Facility: HOSPITAL | Age: 39
Discharge: HOME | End: 2024-10-09
Attending: INTERNAL MEDICINE
Payer: COMMERCIAL

## 2024-10-09 VITALS
HEART RATE: 71 BPM | HEIGHT: 74 IN | DIASTOLIC BLOOD PRESSURE: 62 MMHG | SYSTOLIC BLOOD PRESSURE: 110 MMHG | OXYGEN SATURATION: 98 % | BODY MASS INDEX: 40.43 KG/M2 | WEIGHT: 315 LBS | RESPIRATION RATE: 16 BRPM | TEMPERATURE: 98 F

## 2024-10-09 DIAGNOSIS — E27.9 ADRENAL NODULE (HCC): ICD-10-CM

## 2024-10-09 PROCEDURE — A9575 INJ GADOTERATE MEGLUMI 0.1ML: HCPCS | Performed by: INTERNAL MEDICINE

## 2024-10-09 PROCEDURE — 74183 MRI ABD W/O CNTR FLWD CNTR: CPT | Performed by: INTERNAL MEDICINE

## 2024-10-09 RX ORDER — GADOTERATE MEGLUMINE 376.9 MG/ML
20 INJECTION INTRAVENOUS
Status: COMPLETED | OUTPATIENT
Start: 2024-10-09 | End: 2024-10-09

## 2024-10-09 RX ORDER — PROCHLORPERAZINE EDISYLATE 5 MG/ML
5 INJECTION INTRAMUSCULAR; INTRAVENOUS EVERY 8 HOURS PRN
Status: DISCONTINUED | OUTPATIENT
Start: 2024-10-09 | End: 2024-10-11

## 2024-10-09 RX ORDER — FAMOTIDINE 10 MG/ML
20 INJECTION, SOLUTION INTRAVENOUS ONCE
Status: COMPLETED | OUTPATIENT
Start: 2024-10-09 | End: 2024-10-09

## 2024-10-09 RX ORDER — FAMOTIDINE 20 MG/1
20 TABLET, FILM COATED ORAL ONCE
Status: COMPLETED | OUTPATIENT
Start: 2024-10-09 | End: 2024-10-09

## 2024-10-09 RX ORDER — ACETAMINOPHEN 500 MG
1000 TABLET ORAL ONCE
Status: DISCONTINUED | OUTPATIENT
Start: 2024-10-09 | End: 2024-10-11

## 2024-10-09 RX ORDER — SODIUM CHLORIDE, SODIUM LACTATE, POTASSIUM CHLORIDE, CALCIUM CHLORIDE 600; 310; 30; 20 MG/100ML; MG/100ML; MG/100ML; MG/100ML
INJECTION, SOLUTION INTRAVENOUS CONTINUOUS
Status: DISCONTINUED | OUTPATIENT
Start: 2024-10-09 | End: 2024-10-11

## 2024-10-09 RX ORDER — METOCLOPRAMIDE HYDROCHLORIDE 5 MG/ML
10 INJECTION INTRAMUSCULAR; INTRAVENOUS ONCE
Status: DISCONTINUED | OUTPATIENT
Start: 2024-10-09 | End: 2024-10-11

## 2024-10-09 RX ORDER — ONDANSETRON 2 MG/ML
4 INJECTION INTRAMUSCULAR; INTRAVENOUS EVERY 6 HOURS PRN
Status: DISCONTINUED | OUTPATIENT
Start: 2024-10-09 | End: 2024-10-11

## 2024-10-09 RX ORDER — LIDOCAINE HYDROCHLORIDE 10 MG/ML
INJECTION, SOLUTION EPIDURAL; INFILTRATION; INTRACAUDAL; PERINEURAL AS NEEDED
Status: DISCONTINUED | OUTPATIENT
Start: 2024-10-09 | End: 2024-10-09 | Stop reason: SURG

## 2024-10-09 RX ORDER — METOCLOPRAMIDE 10 MG/1
10 TABLET ORAL ONCE
Status: DISCONTINUED | OUTPATIENT
Start: 2024-10-09 | End: 2024-10-11

## 2024-10-09 RX ORDER — ROCURONIUM BROMIDE 10 MG/ML
INJECTION, SOLUTION INTRAVENOUS AS NEEDED
Status: DISCONTINUED | OUTPATIENT
Start: 2024-10-09 | End: 2024-10-09 | Stop reason: SURG

## 2024-10-09 RX ADMIN — LIDOCAINE HYDROCHLORIDE 50 MG: 10 INJECTION, SOLUTION EPIDURAL; INFILTRATION; INTRACAUDAL; PERINEURAL at 15:42:00

## 2024-10-09 RX ADMIN — GADOTERATE MEGLUMINE 20 ML: 376.9 INJECTION INTRAVENOUS at 16:17:00

## 2024-10-09 RX ADMIN — ROCURONIUM BROMIDE 50 MG: 10 INJECTION, SOLUTION INTRAVENOUS at 15:42:00

## 2024-10-09 RX ADMIN — FAMOTIDINE 20 MG: 20 TABLET, FILM COATED ORAL at 13:27:00

## 2024-10-09 RX ADMIN — SODIUM CHLORIDE, SODIUM LACTATE, POTASSIUM CHLORIDE, CALCIUM CHLORIDE: 600; 310; 30; 20 INJECTION, SOLUTION INTRAVENOUS at 13:27:00

## 2024-10-09 NOTE — ANESTHESIA PROCEDURE NOTES
Airway  Date/Time: 10/9/2024 3:44 PM  Urgency: Elective    Airway not difficult    General Information and Staff    Patient location during procedure: OR  Anesthesiologist: Susie Gonzalez MD  Performed: anesthesiologist   Performed by: Susie Gonzalez MD  Authorized by: Susie Gonzalez MD      Indications and Patient Condition  Indications for airway management: anesthesia  Sedation level: deep  Preoxygenated: yes  Patient position: sniffing  MILS maintained throughout  Mask difficulty assessment: 0 - not attempted    Final Airway Details  Final airway type: endotracheal airway      Successful airway: ETT  Cuffed: yes   Successful intubation technique: Video laryngoscopy  Endotracheal tube insertion site: oral  Blade: GlideScope  Blade size: #3  ETT size (mm): 7.5    Cormack-Lehane Classification: grade I - full view of glottis  Placement verified by: capnometry   Measured from: teeth  ETT to teeth (cm): 21  Number of attempts at approach: 1

## 2024-10-09 NOTE — DISCHARGE INSTRUCTIONS
HOME INSTRUCTIONS  [facility] HOME CARE INSTRUCTIONS: POST-OP ANESTHESIA  The medication that you received for sedation or general anesthesia can last up to 24 hours. Your judgment and reflexes may be altered, even if you feel like your normal self.      We Recommend:   Do not drive any motor vehicle or bicycle   Avoid mowing the lawn, playing sports, or working with power tools/applicances (power saws, electric knives or mixers)   That you have someone stay with you on your first night home   Do not drink alcohol or take sleeping pills or tranquilizers   Do not sign legal documents within 24 hours of your procedure   If you had a nerve block for your surgery, take extra care not to put any pressure on your arm or hand for 24 hours    It is normal:  For you to have a sore throat if you had a breathing tube during surgery (while you were asleep!). The sore throat should get better within 48 hours. You can gargle with warm salt water (1/2 tsp in 4 oz warm water) or use a throat lozenge for comfort  To feel muscle aches or soreness especially in the abdomen, chest or neck. The achy feeling should go away in the next 24 hours  To feel weak, sleepy or \"wiped out\". Your should start feeling better in the next 24 hours.   To experience mild discomforts such as sore lip or tongue, headache, cramps, gas pains or a bloated feeling in your abdomen.   To experience mild back pain or soreness for a day or two if you had spinal or epidural anesthesia.   If you had laparoscopic surgery, to feel shoulder pain or discomfort on the day of surgery.   For some patients to have nausea after surgery/anesthesia    If you feel nausea or experience vomiting:   Try to move around less.   Eat less than usual or drink only liquids until the next morning   Nausea should resolve in about 24 hours    If you have a problem when you are at home:    Call your surgeons office +      Discharge Instructions: After Your Surgery  You’ve just had surgery.  During surgery, you were given medicine called anesthesia to keep you relaxed and free of pain. After surgery, you may have some pain or nausea. This is common. Here are some tips for feeling better and getting well after surgery.   Going home  Your healthcare provider will show you how to take care of yourself when you go home. They'll also answer your questions. Have an adult family member or friend drive you home. For the first 24 hours after your surgery:   Don't drive or use heavy equipment.  Don't make important decisions or sign legal papers.  Take medicines as directed.  Don't drink alcohol.  Have someone stay with you, if needed. They can watch for problems and help keep you safe.  Be sure to go to all follow-up visits with your healthcare provider. And rest after your surgery for as long as your provider tells you to.   Coping with pain  If you have pain after surgery, pain medicine will help you feel better. Take it as directed, before pain becomes severe. Also, ask your healthcare provider or pharmacist about other ways to control pain. This might be with heat, ice, or relaxation. And follow any other instructions your surgeon or nurse gives you.      Stay on schedule with your medicine.     Tips for taking pain medicine  To get the best relief possible, remember these points:   Pain medicines can upset your stomach. Taking them with a little food may help.  Most pain relievers taken by mouth need at least 20 to 30 minutes to start to work.  Don't wait till your pain becomes severe before you take your medicine. Try to time your medicine so that you can take it before starting an activity. This might be before you get dressed, go for a walk, or sit down for dinner.  Constipation is a common side effect of some pain medicines. Call your healthcare provider before taking any medicines such as laxatives or stool softeners to help ease constipation. Also ask if you should skip any foods. Drinking lots of  fluids and eating foods such as fruits and vegetables that are high in fiber can also help. Remember, don't take laxatives unless your surgeon has prescribed them.  Drinking alcohol and taking pain medicine can cause dizziness and slow your breathing. It can even be deadly. Don't drink alcohol while taking pain medicine.  Pain medicine can make you react more slowly to things. Don't drive or run machinery while taking pain medicine.  Your healthcare provider may tell you to take acetaminophen to help ease your pain. Ask them how much you're supposed to take each day. Acetaminophen or other pain relievers may interact with your prescription medicines or other over-the-counter (OTC) medicines. Some prescription medicines have acetaminophen and other ingredients in them. Using both prescription and OTC acetaminophen for pain can cause you to accidentally overdose. Read the labels on your OTC medicines with care. This will help you to clearly know the list of ingredients, how much to take, and any warnings. It may also help you not take too much acetaminophen. If you have questions or don't understand the information, ask your pharmacist or healthcare provider to explain it to you before you take the OTC medicine.   Managing nausea  Some people have an upset stomach (nausea) after surgery. This is often because of anesthesia, pain, or pain medicine, less movement of food in the stomach, or the stress of surgery. These tips will help you handle nausea and eat healthy foods as you get better. If you were on a special food plan before surgery, ask your healthcare provider if you should follow it while you get better. Check with your provider on how your eating should progress. It may depend on the surgery you had. These general tips may help:   Don't push yourself to eat. Your body will tell you when to eat and how much.  Start off with clear liquids and soup. They're easier to digest.  Next try semi-solid foods as you  feel ready. These include mashed potatoes, applesauce, and gelatin.  Slowly move to solid foods. Don’t eat fatty, rich, or spicy foods at first.  Don't force yourself to have 3 large meals a day. Instead eat smaller amounts more often.  Take pain medicines with a small amount of solid food, such as crackers or toast. This helps prevent nausea.  When to call your healthcare provider  Call your healthcare provider right away if you have any of these:   You still have too much pain, or the pain gets worse, after taking the medicine. The medicine may not be strong enough. Or there may be a complication from the surgery.  You feel too sleepy, dizzy, or groggy. The medicine may be too strong.  Side effects such as nausea or vomiting. Your healthcare provider may advise taking other medicines to .  Skin changes such as rash, itching, or hives. This may mean you have an allergic reaction. Your provider may advise taking other medicines.  The incision looks different (for instance, part of it opens up).  Bleeding or fluid leaking from the incision site, and weren't told to expect that.  Fever of 100.4°F (38°C) or higher, or as directed by your provider.  Call 911  Call 911 right away if you have:   Trouble breathing  Facial swelling    If you have obstructive sleep apnea   You were given anesthesia medicine during surgery to keep you comfortable and free of pain. After surgery, you may have more apnea spells because of this medicine and other medicines you were given. The spells may last longer than normal.    At home:  Keep using the continuous positive airway pressure (CPAP) device when you sleep. Unless your healthcare provider tells you not to, use it when you sleep, day or night. CPAP is a common device used to treat obstructive sleep apnea.  Talk with your provider before taking any pain medicine, muscle relaxants, or sedatives. Your provider will tell you about the possible dangers of taking these medicines.  Contact  your provider if your sleeping changes a lot even when taking medicines as directed.  Phyllis last reviewed this educational content on 10/1/2021  © 5123-4064 [employer]. All rights reserved. This information is not intended as a substitute for professional medical care. Always follow your healthcare professional's instructions.

## 2024-10-09 NOTE — ANESTHESIA POSTPROCEDURE EVALUATION
Patient: Jac Bess    Procedure Summary       Date: 10/09/24 Room / Location: [facility]; [facility] Post Anesthesia Care Unit    Anesthesia Start: 1539 Anesthesia Stop:     Procedure: MRI ABDOMEN (W+WO) (CPT=74183) Diagnosis: Adrenal nodule (HCC)    Scheduled Providers:  Anesthesiologist: Susie Gonzalez MD    Anesthesia Type: general ASA Status: 3            Anesthesia Type: general    Vitals Value Taken Time   BP 98/68 10/09/24 1640   Temp 97.8 10/09/24 1640   Pulse 78 10/09/24 1639   Resp 19 10/09/24 1639   SpO2 92 % 10/09/24 1639   Vitals shown include unfiled device data.    [facility] AN Post Evaluation:   Patient Evaluated in PACU  Patient Participation: complete - patient participated  Level of Consciousness: awake and alert  Pain Score: 0  Pain Management: adequate  Airway Patency:patent  Yes    Nausea/Vomiting: none  Cardiovascular Status: acceptable  Respiratory Status: acceptable  Postoperative Hydration acceptable      Susie Gonzalez MD  10/9/2024 4:40 PM

## 2024-10-13 ENCOUNTER — TELEPHONE (OUTPATIENT)
Dept: INTERNAL MEDICINE CLINIC | Facility: CLINIC | Age: 39
End: 2024-10-13

## 2024-10-13 DIAGNOSIS — E66.01 MORBID OBESITY WITH BMI OF 45.0-49.9, ADULT (HCC): Primary | ICD-10-CM

## 2024-10-13 DIAGNOSIS — K76.0 FATTY LIVER: ICD-10-CM

## 2024-10-22 ENCOUNTER — PATIENT MESSAGE (OUTPATIENT)
Dept: INTERNAL MEDICINE CLINIC | Facility: CLINIC | Age: 39
End: 2024-10-22

## 2024-10-22 NOTE — TELEPHONE ENCOUNTER
There are active labs in patient's chart written by Dr. Yarbrough on 10/13/24.     Hi Jac  MRI of your abdomen did confirm a benign adenoma of your left adrenal gland.  Continue your follow-up with your endocrinologist.  Incidental finding of severe fatty liver as well as mild enlargement of your spleen.  I would want you to do additional blood test just to rule out different causes of fatty liver disease.  I will put those orders in your chart and you could get them done.  It is likely though that it is your weight that is causing the fatty liver as long as you are not drinking alcohol.  We need to work on losing weight and I will refer you to our bariatric clinic for medical treatment.  I will put the referral in your chart.   Written by Luke Yarbrough MD on 10/13/2024 10:28 AM CDT  Seen by patient Jac CRUZ Maria Alejandra on 10/22/2024  6:30 AM

## 2024-11-05 ENCOUNTER — OFFICE VISIT (OUTPATIENT)
Dept: NEPHROLOGY | Facility: CLINIC | Age: 39
End: 2024-11-05
Payer: COMMERCIAL

## 2024-11-05 VITALS
SYSTOLIC BLOOD PRESSURE: 102 MMHG | HEART RATE: 78 BPM | HEIGHT: 74 IN | DIASTOLIC BLOOD PRESSURE: 70 MMHG | BODY MASS INDEX: 40.43 KG/M2 | WEIGHT: 315 LBS

## 2024-11-05 DIAGNOSIS — N18.2 CKD (CHRONIC KIDNEY DISEASE) STAGE 2, GFR 60-89 ML/MIN: Primary | ICD-10-CM

## 2024-11-05 PROCEDURE — 3008F BODY MASS INDEX DOCD: CPT | Performed by: INTERNAL MEDICINE

## 2024-11-05 PROCEDURE — 99245 OFF/OP CONSLTJ NEW/EST HI 55: CPT | Performed by: INTERNAL MEDICINE

## 2024-11-05 PROCEDURE — 3074F SYST BP LT 130 MM HG: CPT | Performed by: INTERNAL MEDICINE

## 2024-11-05 PROCEDURE — 3078F DIAST BP <80 MM HG: CPT | Performed by: INTERNAL MEDICINE

## 2024-11-06 NOTE — PROGRESS NOTES
11/05/24        Patient: Jac Bess   YOB: 1985   Date of Visit: 11/5/2024       Dear  Dr. Linnea MD,      Thank you for referring Jac Bess to my practice.  Please find my assessment and plan below.      As you know he is a 39-year-old male with a history of anxiety/depression, chronic pain syndrome, history of a deep venous thrombosis in his left lower extremity, hypertriglyceridemia, history of addiction to narcotic pain relievers and a history of low testosterone levels who I now have the pleasure of seeing for evaluation of chronic kidney disease stage II.  Patient's laboratory studies have been reviewed in care everywhere in epic.  Back on June 2, 2021 he had a creatinine 1.16 with an estimated GFR 81 cc/min.  There is a gap until July 2024 when he had a creatinine 1.47 with an estimated GFR of 62 cc/min.  Repeat labs in July 13, 2024 showed creatinine 1.43 with an estimated GFR of 64 cc/min and renal consultation has now been advised.    His past medical history is significant for anxiety, panic attacks and depression.  He also has a chronic pain syndrome and had in the past been seeing a pain clinic physician.  Primarily chronic low back pain.  That physician have not been taking ibuprofen as needed.  Probably took it 2-3 times per week.  He also placed him on testosterone.  Status post deep venous thrombosis in the left lower extremity.  Testosterone was thought to be playing a role which was discontinued approximately 4 months ago.  Was on Eliquis for 3 months now off.  Medications otherwise as listed.    Social history smoked in the past but quit.  Does vape.  Lives with his wife.  Family history negative for renal pathology.  Review of system patient otherwise states he is doing okay without any chest pain, shortness of breath, GI or urinary tract symptoms.  Anxiety is still an issue off and on.    On physical exam his blood pressure 102/70 with a pulse of 78 he weighed  373 pounds.  His neck was supple without JVD.  Lungs are clear.  Heart revealed a regular rate and rhythm without gallops or murmurs.  Abdomen was soft, flat, nontender without organomegaly, masses or bruits.  Extremities revealed no edema.    I therefore informed the patient and his wife that he does have an elevated creatinine.  GFR is still above 60.  Therefore represents chronic kidney disease stage II.  Suspect ibuprofen may be playing a role.  Other causes need to be excluded.  For completion sake we will do a CBC, renal panel, urinalysis, urine for microalbumin, sed rate, connective tissue profile.  The patient just had a renal ultrasound done on September 11, 2024 which was unremarkable.  He also had a recent MRI done on October 9, 2024.  Severe hepatic steatosis and mild splenomegaly was noted.  A left adrenal nodule was also noted which appears to be a benign lipid rich adenoma.  The patient is seeing endocrine as well.  Further impressions and recommendations will be forthcoming after reviewing the above.  Reinforced importance of maintaining adequate duration.  Avoid any further use of ibuprofen or other nonsteroidals.    Thank you again for allowing me to participate in the care of your patient.  If you have any questions please feel free to call.           Sincerely,   Dallas Warner MD   [facility] [place]  [address]  [place] [address]    Document electronically generated by:  Dallas Warner MD

## 2024-11-06 NOTE — PATIENT INSTRUCTIONS
Please do laboratory studies as ordered.  Avoid any further use of ibuprofen or similar medications.

## 2024-12-24 ENCOUNTER — LAB ENCOUNTER (OUTPATIENT)
Dept: LAB | Age: 39
End: 2024-12-24
Attending: INTERNAL MEDICINE
Payer: COMMERCIAL

## 2024-12-24 DIAGNOSIS — E27.8 LEFT ADRENAL MASS (HCC): ICD-10-CM

## 2024-12-24 DIAGNOSIS — R79.89 LOW TESTOSTERONE: ICD-10-CM

## 2024-12-24 DIAGNOSIS — Z86.718 HISTORY OF DVT (DEEP VEIN THROMBOSIS): ICD-10-CM

## 2024-12-24 DIAGNOSIS — F41.9 ANXIETY: ICD-10-CM

## 2024-12-24 DIAGNOSIS — M54.9 CHRONIC BACK PAIN, UNSPECIFIED BACK LOCATION, UNSPECIFIED BACK PAIN LATERALITY: ICD-10-CM

## 2024-12-24 DIAGNOSIS — M54.2 NECK PAIN: ICD-10-CM

## 2024-12-24 DIAGNOSIS — F32.A ANXIETY AND DEPRESSION: ICD-10-CM

## 2024-12-24 DIAGNOSIS — E27.9 ADRENAL GLAND DISEASE (HCC): ICD-10-CM

## 2024-12-24 DIAGNOSIS — Z79.01 BLOOD THINNED DUE TO LONG-TERM ANTICOAGULANT USE: ICD-10-CM

## 2024-12-24 DIAGNOSIS — N18.2 CKD (CHRONIC KIDNEY DISEASE) STAGE 2, GFR 60-89 ML/MIN: ICD-10-CM

## 2024-12-24 DIAGNOSIS — E66.813 CLASS 3 SEVERE OBESITY WITH BODY MASS INDEX (BMI) OF 45.0 TO 49.9 IN ADULT, UNSPECIFIED OBESITY TYPE, UNSPECIFIED WHETHER SERIOUS COMORBIDITY PRESENT (HCC): ICD-10-CM

## 2024-12-24 DIAGNOSIS — F41.9 ANXIETY AND DEPRESSION: ICD-10-CM

## 2024-12-24 DIAGNOSIS — G89.29 CHRONIC BACK PAIN, UNSPECIFIED BACK LOCATION, UNSPECIFIED BACK PAIN LATERALITY: ICD-10-CM

## 2024-12-24 DIAGNOSIS — E66.01 CLASS 3 SEVERE OBESITY WITH BODY MASS INDEX (BMI) OF 45.0 TO 49.9 IN ADULT, UNSPECIFIED OBESITY TYPE, UNSPECIFIED WHETHER SERIOUS COMORBIDITY PRESENT (HCC): ICD-10-CM

## 2024-12-24 DIAGNOSIS — K76.0 FATTY LIVER: ICD-10-CM

## 2024-12-24 LAB
ALBUMIN SERPL-MCNC: 4.8 G/DL (ref 3.2–4.8)
ALBUMIN SERPL-MCNC: 4.8 G/DL (ref 3.2–4.8)
ALP LIVER SERPL-CCNC: 62 U/L
ALT SERPL-CCNC: 26 U/L
ANION GAP SERPL CALC-SCNC: 11 MMOL/L (ref 0–18)
AST SERPL-CCNC: 20 U/L (ref ?–34)
BASOPHILS # BLD AUTO: 0.05 X10(3) UL (ref 0–0.2)
BASOPHILS NFR BLD AUTO: 0.7 %
BILIRUB DIRECT SERPL-MCNC: <0.1 MG/DL (ref ?–0.3)
BILIRUB SERPL-MCNC: 0.3 MG/DL (ref 0.3–1.2)
BILIRUB UR QL: NEGATIVE
BUN BLD-MCNC: 19 MG/DL (ref 9–23)
BUN/CREAT SERPL: 15.2 (ref 10–20)
C3 SERPL-MCNC: 201.6 MG/DL (ref 84–160)
C4 SERPL-MCNC: 50.4 MG/DL (ref 12–36)
CALCIUM BLD-MCNC: 9.7 MG/DL (ref 8.7–10.4)
CHLORIDE SERPL-SCNC: 104 MMOL/L (ref 98–112)
CLARITY UR: CLEAR
CO2 SERPL-SCNC: 26 MMOL/L (ref 21–32)
COLOR UR: YELLOW
CREAT BLD-MCNC: 1.25 MG/DL
CREAT UR-SCNC: 239.2 MG/DL
CRP SERPL-MCNC: <0.4 MG/DL (ref ?–1)
DEPRECATED RDW RBC AUTO: 39.1 FL (ref 35.1–46.3)
EGFRCR SERPLBLD CKD-EPI 2021: 76 ML/MIN/1.73M2 (ref 60–?)
EOSINOPHIL # BLD AUTO: 0.1 X10(3) UL (ref 0–0.7)
EOSINOPHIL NFR BLD AUTO: 1.5 %
ERYTHROCYTE [DISTWIDTH] IN BLOOD BY AUTOMATED COUNT: 12.8 % (ref 11–15)
ERYTHROCYTE [SEDIMENTATION RATE] IN BLOOD: 16 MM/HR
FSH SERPL-ACNC: 6.3 MIU/ML
GLUCOSE BLD-MCNC: 121 MG/DL (ref 70–99)
GLUCOSE UR-MCNC: NORMAL MG/DL
HBV CORE AB SERPL QL IA: NONREACTIVE
HBV SURFACE AB SER QL: NONREACTIVE
HBV SURFACE AB SERPL IA-ACNC: <3.1 MIU/ML
HBV SURFACE AG SER-ACNC: <0.1 [IU]/L
HBV SURFACE AG SERPL QL IA: NONREACTIVE
HCT VFR BLD AUTO: 48.7 %
HCV AB SERPL QL IA: NONREACTIVE
HGB BLD-MCNC: 15.8 G/DL
HGB UR QL STRIP.AUTO: NEGATIVE
IMM GRANULOCYTES # BLD AUTO: 0.02 X10(3) UL (ref 0–1)
IMM GRANULOCYTES NFR BLD: 0.3 %
KETONES UR-MCNC: NEGATIVE MG/DL
LEUKOCYTE ESTERASE UR QL STRIP.AUTO: NEGATIVE
LH SERPL-ACNC: 3.2 MIU/ML
LYMPHOCYTES # BLD AUTO: 1.68 X10(3) UL (ref 1–4)
LYMPHOCYTES NFR BLD AUTO: 24.4 %
MCH RBC QN AUTO: 27.7 PG (ref 26–34)
MCHC RBC AUTO-ENTMCNC: 32.4 G/DL (ref 31–37)
MCV RBC AUTO: 85.3 FL
MICROALBUMIN UR-MCNC: <0.3 MG/DL
MONOCYTES # BLD AUTO: 0.39 X10(3) UL (ref 0.1–1)
MONOCYTES NFR BLD AUTO: 5.7 %
NEUTROPHILS # BLD AUTO: 4.64 X10 (3) UL (ref 1.5–7.7)
NEUTROPHILS # BLD AUTO: 4.64 X10(3) UL (ref 1.5–7.7)
NEUTROPHILS NFR BLD AUTO: 67.4 %
NITRITE UR QL STRIP.AUTO: NEGATIVE
OSMOLALITY SERPL CALC.SUM OF ELEC: 296 MOSM/KG (ref 275–295)
PH UR: 5.5 [PH] (ref 5–8)
PHOSPHATE SERPL-MCNC: 3.1 MG/DL (ref 2.4–5.1)
PLATELET # BLD AUTO: 180 10(3)UL (ref 150–450)
POTASSIUM SERPL-SCNC: 3.5 MMOL/L (ref 3.5–5.1)
PROLACTIN SERPL-MCNC: 7.5 NG/ML
PROT SERPL-MCNC: 7.6 G/DL (ref 5.7–8.2)
PROT UR-MCNC: 7.6 MG/DL (ref ?–14)
PROT UR-MCNC: NEGATIVE MG/DL
RBC # BLD AUTO: 5.71 X10(6)UL
RHEUMATOID FACT SERPL-ACNC: 4.2 IU/ML (ref ?–14)
SODIUM SERPL-SCNC: 141 MMOL/L (ref 136–145)
SP GR UR STRIP: 1.03 (ref 1–1.03)
TSI SER-ACNC: 2.19 UIU/ML (ref 0.55–4.78)
UROBILINOGEN UR STRIP-ACNC: NORMAL
WBC # BLD AUTO: 6.9 X10(3) UL (ref 4–11)

## 2024-12-24 PROCEDURE — 86431 RHEUMATOID FACTOR QUANT: CPT

## 2024-12-24 PROCEDURE — 84146 ASSAY OF PROLACTIN: CPT

## 2024-12-24 PROCEDURE — 80076 HEPATIC FUNCTION PANEL: CPT

## 2024-12-24 PROCEDURE — 84443 ASSAY THYROID STIM HORMONE: CPT

## 2024-12-24 PROCEDURE — 82103 ALPHA-1-ANTITRYPSIN TOTAL: CPT

## 2024-12-24 PROCEDURE — 83002 ASSAY OF GONADOTROPIN (LH): CPT

## 2024-12-24 PROCEDURE — 86335 IMMUNFIX E-PHORSIS/URINE/CSF: CPT

## 2024-12-24 PROCEDURE — 84156 ASSAY OF PROTEIN URINE: CPT

## 2024-12-24 PROCEDURE — 86038 ANTINUCLEAR ANTIBODIES: CPT

## 2024-12-24 PROCEDURE — 86160 COMPLEMENT ANTIGEN: CPT

## 2024-12-24 PROCEDURE — 84166 PROTEIN E-PHORESIS/URINE/CSF: CPT

## 2024-12-24 PROCEDURE — 82390 ASSAY OF CERULOPLASMIN: CPT

## 2024-12-24 PROCEDURE — 80069 RENAL FUNCTION PANEL: CPT

## 2024-12-24 PROCEDURE — 82570 ASSAY OF URINE CREATININE: CPT

## 2024-12-24 PROCEDURE — 85025 COMPLETE CBC W/AUTO DIFF WBC: CPT

## 2024-12-24 PROCEDURE — 81003 URINALYSIS AUTO W/O SCOPE: CPT

## 2024-12-24 PROCEDURE — 87340 HEPATITIS B SURFACE AG IA: CPT

## 2024-12-24 PROCEDURE — 84410 TESTOSTERONE BIOAVAILABLE: CPT

## 2024-12-24 PROCEDURE — 83001 ASSAY OF GONADOTROPIN (FSH): CPT

## 2024-12-24 PROCEDURE — 36415 COLL VENOUS BLD VENIPUNCTURE: CPT

## 2024-12-24 PROCEDURE — 82043 UR ALBUMIN QUANTITATIVE: CPT

## 2024-12-24 PROCEDURE — 86334 IMMUNOFIX E-PHORESIS SERUM: CPT

## 2024-12-24 PROCEDURE — 82784 ASSAY IGA/IGD/IGG/IGM EACH: CPT

## 2024-12-24 PROCEDURE — 86803 HEPATITIS C AB TEST: CPT

## 2024-12-24 PROCEDURE — 83516 IMMUNOASSAY NONANTIBODY: CPT

## 2024-12-24 PROCEDURE — 84165 PROTEIN E-PHORESIS SERUM: CPT

## 2024-12-24 PROCEDURE — 86706 HEP B SURFACE ANTIBODY: CPT

## 2024-12-24 PROCEDURE — 85652 RBC SED RATE AUTOMATED: CPT

## 2024-12-24 PROCEDURE — 86225 DNA ANTIBODY NATIVE: CPT

## 2024-12-24 PROCEDURE — 82610 CYSTATIN C: CPT

## 2024-12-24 PROCEDURE — 86140 C-REACTIVE PROTEIN: CPT

## 2024-12-24 PROCEDURE — 86704 HEP B CORE ANTIBODY TOTAL: CPT

## 2024-12-25 LAB
A-1-ANTITRYPSIN: 147 MG/DL
CERULOPLASMIN SERPL-MCNC: 30 MG/DL (ref 20–60)

## 2024-12-26 LAB
ACTIN SMOOTH MUSCLE AB: 3 UNITS
ALBUMIN SERPL ELPH-MCNC: 4.33 G/DL (ref 3.75–5.21)
ALBUMIN/GLOB SERPL: 1.56 {RATIO} (ref 1–2)
ALPHA1 GLOB SERPL ELPH-MCNC: 0.28 G/DL (ref 0.19–0.46)
ALPHA2 GLOB SERPL ELPH-MCNC: 0.6 G/DL (ref 0.48–1.05)
B-GLOBULIN SERPL ELPH-MCNC: 1.07 G/DL (ref 0.68–1.23)
DSDNA IGG SERPL IA-ACNC: 1.6 IU/ML
ENA AB SER QL IA: 0.2 UG/L
ENA AB SER QL IA: NEGATIVE
GAMMA GLOB SERPL ELPH-MCNC: 0.82 G/DL (ref 0.62–1.7)
IGA SERPL-MCNC: 259.1 MG/DL (ref 40–350)
IGM SERPL-MCNC: 52.9 MG/DL (ref 50–300)
IMMUNOGLOBULIN PNL SER-MCNC: 974 MG/DL (ref 650–1600)
NUCLEAR IGG TITR SER IF: NEGATIVE {TITER}
PROT SERPL-MCNC: 7.1 G/DL (ref 5.7–8.2)

## 2024-12-27 LAB
CYSTATIN C: 1.16 MG/L
EGFR: 70 ML/MIN/1.73

## 2024-12-28 ENCOUNTER — TELEPHONE (OUTPATIENT)
Dept: NEPHROLOGY | Facility: CLINIC | Age: 39
End: 2024-12-28

## 2024-12-31 LAB
SEX HORM BIND GLOB: 13.6 NMOL/L
TESTOST % FREE+WEAK BND: 47.4 %
TESTOST FREE+WEAK BND: 31.4 NG/DL
TESTOSTERONE TOT /MS: 66.2 NG/DL

## 2024-12-31 NOTE — TELEPHONE ENCOUNTER
Informed patient of note below  and last name verified verbalized understanding ,will call back or do thru Mychart to schedule.

## 2025-01-01 DIAGNOSIS — E23.7 PITUITARY DISEASE (HCC): Primary | ICD-10-CM

## 2025-01-03 ENCOUNTER — TELEPHONE (OUTPATIENT)
Dept: INTERNAL MEDICINE CLINIC | Facility: CLINIC | Age: 40
End: 2025-01-03

## 2025-01-03 NOTE — TELEPHONE ENCOUNTER
Verified name and .    Patient states that he ran out of Alprazolam 2 mg- prescribed by psychiatrist, Dr. Vazquez- and needs a refill now.    He states that he spoke with nurse at Dr. Vazquez's office who stated that patient needs to go to the emergency room regarding this as Dr. Vazquez is out of office at this time. He states that he is currently at [facility] emergency room and the ER provider states that they cannot prescribe this medication.    Upon review of [facility], patient received 30 day supply of Alprazolam on 24, however, he states he ran out of medication already.     He is requesting that message be sent to Dr. Yarbrough to request a temporary prescription of this medication to be sent to pharmacy for him while he awaits Dr. Vazquez to return to the office.    Patient was advised that since Dr. Yarbrough does not manage this medication, he is advised to reach back out to psychiatrist's office. He still requests that message be sent to Dr. Yarbrough.    ALPRAZolam     Dispensed Written Strength Quantity Refills Days Supply Provider Pharmacy   ALPRAZOLAM 2024 2 mg 60  30 SANJUANITA VAZQUEZ

## 2025-01-21 ENCOUNTER — OFFICE VISIT (OUTPATIENT)
Facility: LOCATION | Age: 40
End: 2025-01-21
Payer: COMMERCIAL

## 2025-01-21 VITALS
BODY MASS INDEX: 40.43 KG/M2 | SYSTOLIC BLOOD PRESSURE: 124 MMHG | DIASTOLIC BLOOD PRESSURE: 84 MMHG | HEART RATE: 96 BPM | HEIGHT: 74 IN | WEIGHT: 315 LBS

## 2025-01-21 DIAGNOSIS — F41.9 ANXIETY: ICD-10-CM

## 2025-01-21 DIAGNOSIS — Z86.718 HISTORY OF DVT (DEEP VEIN THROMBOSIS): ICD-10-CM

## 2025-01-21 DIAGNOSIS — G89.29 CHRONIC BACK PAIN, UNSPECIFIED BACK LOCATION, UNSPECIFIED BACK PAIN LATERALITY: ICD-10-CM

## 2025-01-21 DIAGNOSIS — E66.813 CLASS 3 SEVERE OBESITY WITH BODY MASS INDEX (BMI) OF 45.0 TO 49.9 IN ADULT, UNSPECIFIED OBESITY TYPE, UNSPECIFIED WHETHER SERIOUS COMORBIDITY PRESENT (HCC): ICD-10-CM

## 2025-01-21 DIAGNOSIS — E27.8 LEFT ADRENAL MASS (HCC): Primary | ICD-10-CM

## 2025-01-21 DIAGNOSIS — F32.A ANXIETY AND DEPRESSION: ICD-10-CM

## 2025-01-21 DIAGNOSIS — R79.89 LOW TESTOSTERONE: ICD-10-CM

## 2025-01-21 DIAGNOSIS — M54.9 CHRONIC BACK PAIN, UNSPECIFIED BACK LOCATION, UNSPECIFIED BACK PAIN LATERALITY: ICD-10-CM

## 2025-01-21 DIAGNOSIS — Z79.01 BLOOD THINNED DUE TO LONG-TERM ANTICOAGULANT USE: ICD-10-CM

## 2025-01-21 DIAGNOSIS — E27.9 ADRENAL GLAND DISEASE (HCC): ICD-10-CM

## 2025-01-21 DIAGNOSIS — E66.01 CLASS 3 SEVERE OBESITY WITH BODY MASS INDEX (BMI) OF 45.0 TO 49.9 IN ADULT, UNSPECIFIED OBESITY TYPE, UNSPECIFIED WHETHER SERIOUS COMORBIDITY PRESENT (HCC): ICD-10-CM

## 2025-01-21 DIAGNOSIS — R06.83 SNORING: ICD-10-CM

## 2025-01-21 DIAGNOSIS — F41.9 ANXIETY AND DEPRESSION: ICD-10-CM

## 2025-01-21 PROCEDURE — 3008F BODY MASS INDEX DOCD: CPT | Performed by: INTERNAL MEDICINE

## 2025-01-21 PROCEDURE — 3074F SYST BP LT 130 MM HG: CPT | Performed by: INTERNAL MEDICINE

## 2025-01-21 PROCEDURE — 3079F DIAST BP 80-89 MM HG: CPT | Performed by: INTERNAL MEDICINE

## 2025-01-21 PROCEDURE — 99214 OFFICE O/P EST MOD 30 MIN: CPT | Performed by: INTERNAL MEDICINE

## 2025-01-21 NOTE — PATIENT INSTRUCTIONS
Follow up with pcp, fertility doctor and methadone /pain medicine   Will refer to get sleep study   RTC in 6 mo and do labs before

## 2025-01-21 NOTE — PROGRESS NOTES
Reason for Visit:   low testosterone , adrenal nodule      Requesting Physician:   Mervat Yarbrough MD    CHIEF COMPLAINT:    Chief Complaint   Patient presents with    Adrenal Problem     F/u        HISTORY OF PRESENT ILLNESS:   Jac Bess is a 39 year old male who presents with  low testosterone on testosterone IM, Lt adrenal nodule on CT in 2023, DVT on blood thinner,  trying to conceive, chronic pain, methadone  use for ~ 10 yrs, obese     Was seen with his wife who is a nurse.   Had labs MRI adrenal, and saw nephrology   They are working with methadone clinic now and lowering the dose.   They are interested in fertility and seeing fertility DrSita   Had DVT and was started on blood thinner   Ordered MRI pituitary but will wait now since they are titrating down methadone     Working out and trying to lose wt.      Snoring and would like to get sleep study         We discussed result   Normal NMN/MN, brian /Renin, DHEAs   High DST so we did salivary and urinary free cortisol, which both were normal    10/10/2024  MRI adrenal showed lipid rich adenoma. We discussed the findings     Has been on pain med for ~ 10 yrs. He had low back enrgy.  Pain med doctor ordered testosterone and was started on tx in 2023.   He takes testo CYP.  200mg/IM 1 ML weekly   Started in 2023.   Had semen analysis which showed no sperm per Pt. Stopped testo 7/2024.     They are trying to conceive now and home test for sperm count which was < 25K    Puberty: unremarkable   He does not have  biological children.   Steroid: No   Anabolic steroids: No   Previous testosterone or \"supplements\" :  mg/week started 2023   Head trauma: No   Infection (mumps) or trauma to the testicles: No   Neck/back pain on methadone      CT ABD 5/2023 Left adrenal nodule, indeterminate. MRI is recommended for further characterization.   Gets chest pain /tightness but has THALIA /MDD  No striae   Has panic disorder   On psych meds   No HTN or  spontaneous hypokalemia   Wt Readings from Last 6 Encounters:   01/21/25 (!) 373 lb (169.2 kg)   11/05/24 (!) 373 lb (169.2 kg)   10/09/24 (!) 362 lb (164.2 kg)   09/12/24 (!) 350 lb (158.8 kg)   09/16/24 (!) 364 lb (165.1 kg)   08/29/24 (!) 352 lb (159.7 kg)        10/09/24 16:17   MRI ABDOMEN (W+WO) (CPT=74183) Left adrenal nodule, new from the 2010 abdominal CT and consistent with a benign lipid rich adenoma         ASSESSMENT AND PLAN:  39 year old man who presents with  low testosterone on testosterone IM, Lt adrenal nodule on CT in 2023, DVT on blood thinner,  trying to conceive, chronic pain, methadone  use for ~ 10 yrs, obese   Discussed with pt and his wife in length   He has been on methadone for yrs which can be the etiology for hypogonadism and his symptoms  Working on lowering methadone doses now   Working w/ fertility doctor   Will not start testosterone to avoid effect on fertility       Lt adrenal on CT in 2023.   Biochemical w/u is negative for hyperaldo, cushing or pheo.   7/2024 Normal NMN/MN, brian /Renin, DHEAs. High DST so we did salivary and urinary free cortisol, which both were normal  24 hr urine creat high so will get US kidney and see nephrology   He has nonspecific symptoms.   10/9/2024  MRI benign lipid rich adenoma   7/2024 Stopped testosterone  Plan    Follow up with pcp, fertility doctor and methadone /pain medicine   Will refer to get sleep study   RTC in 6 mo and do labs before   PAST MEDICAL HISTORY:   Past Medical History:    Anxiety    Anxiety state    and panic disorder    Back problem    Depression    Disorder of liver    fatty liver    DVT of lower extremity (deep venous thrombosis) (HCC)    left leg    Migraines    Obesity    Panic disorder   Chronic pain  Methadone use   Low testosterone   Low sperm count   THALIA/MDD     PAST SURGICAL HISTORY:   Past Surgical History:   Procedure Laterality Date    Removal gallbladder      2020       CURRENT MEDICATIONS:    No outpatient  medications have been marked as taking for the 1/21/25 encounter (Office Visit) with Kayce Patterson MD.       ALLERGIES:  Allergies   Allergen Reactions    Seasonal OTHER (SEE COMMENTS)     Sneezing and runny nose.       SOCIAL HISTORY:    Social History     Socioeconomic History    Marital status: Single   Tobacco Use    Smoking status: Former     Current packs/day: 0.50     Average packs/day: 0.5 packs/day for 3.1 years (1.5 ttl pk-yrs)     Types: Cigarettes     Start date: 1/1/2022     Passive exposure: Past    Smokeless tobacco: Never   Vaping Use    Vaping status: Every Day    Substances: Nicotine, Flavoring    Devices: Disposable   Substance and Sexual Activity    Alcohol use: No    Drug use: Yes     Types: Opiods     Comment: off drugs for 10 years   On disability   Smoking not now   Marijuana occ   Etoh rare  Drugs no  FAMILY HISTORY:   Family History   Problem Relation Age of Onset    Heart Disorder Father         CAD s/p stent 50s    Heart Attack Father     Anxiety Mother     Heart Attack Paternal Grandfather     No Known Problems Sister     No Known Problems Brother      MI in father in his late 50s   No prostate cancer   Aunt with breast cancer     PHYSICAL EXAM:   Height: 6' 2\" (188 cm) (01/21 1340)  Weight: 373 lb (169.2 kg) (01/21 1340)  BSA (Calculated - sq m): 2.83 sq meters (01/21 1340)  Pulse: 96 (01/21 1340)  BP: 124/84 (01/21 1340)  Temp: --  Do Not Use - Resp Rate: --  SpO2: --    Body mass index is 47.89 kg/m².   Obese      DATA:     Pertinent data reviewed   CT ABD 5/2023 Left adrenal nodule, indeterminate. MRI is recommended for further characterization.    Latest Reference Range & Units 12/24/24 10:06   Sex Horm Bind Glob 16.5 - 55.9 nmol/L 13.6 (L)   Testost % Free+Weak Bnd 9.0 - 46.0 % 47.4 (H)   Testost Free+Weak Bnd 40.0 - 250.0 ng/dL 31.4 (L)   Testosterone Tot LC/.0 - 916.0 ng/dL 66.2 (L)      Latest Reference Range & Units 12/24/24 10:06   TSH 0.550 - 4.780 uIU/mL 2.193    Prolactin 2.1-17.7 ng/mL ng/mL 7.5   FSH 1.4-18.1 mIU/mL mIU/mL 6.3   LH 1.5-9.3 mIU/mL mIU/mL 3.2      Latest Reference Range & Units 07/13/24 09:37   Cortisol ug/dL 19.1   RENIN ACTIVITY 0.167 - 5.380 ng/mL/hr 2.079   ACTH 7.2 - 63.3 pg/mL 20.0   ALDOSTERONE 0.0 - 30.0 ng/dL 20.6      Latest Reference Range & Units 07/13/24 09:37   RENIN ACTIVITY 0.167 - 5.380 ng/mL/hr 2.079   NORMETANEPHRINE 0.0 - 210.1 pg/mL 69.2   METANEPHRINE 0.0 - 88.0 pg/mL <25.0      Latest Reference Range & Units 07/13/24 09:37 07/16/24 08:02   Dexa Suppress Cortisol ug/dL 18.5 14.9   DHEA SULFATE 34.5 - 568.9 ug/dL 127.6       Latest Reference Range & Units 07/25/24 21:00 07/26/24 21:00 07/27/24 21:00   Salivary Cortisol MS ug/dL 0.035 0.044 0.099      Latest Reference Range & Units 07/22/24 07:00   Creatinine, Urine Not Estab. mg/dL 118.7   Cortisol Free 24h Ur 5 - 64 ug/24 hr 20   Cortisol Free Ur Undefined ug/L 7   Creat 24h Ur 1000 - 2000 mg/24 hr 3442 (H)   (H): Data is abnormally high       No results for input(s): \"TSH\", \"T4F\", \"T3F\", \"THYP\" in the last 72 hours.  No results found.    Orders Placed This Encounter   Procedures    Testosterone,Total and Weakly Bound w/ [facility]     Orders Placed This Encounter    Testosterone,Total and Weakly Bound w/ [facility]     Standing Status:   Future     Standing Expiration Date:   1/21/2026     Order Specific Question:   Release to patient     Answer:   Immediate          This is a specialized patient consultation in endocrinology and required comprehensive review of prior records, as well as current evaluation, with time required for consideration of complex endocrine issues and consultation. For this visit, I personally interviewed the patient, and family member if accompanied, performed the pertinent parts of the history and physical examination. ROS included screening for appropriate endocrine conditions.   Today's diagnosis and plan were reviewed in detail with the patient who states  understanding and agrees with plan. I discussed with the patient possible diagnosis, differential diagnosis, need for work up, treatment options, alternatives and side effects.     Please see note for details about time spent which includes:   · pre-visit preparation  · reviewing records  · face to face time with the patient   · timely documentation of the encounter  · ordering medications/tests  · communication with care team  · care coordination    I appreciate the opportunity to be part of your patient's medical care and will keep you, as the referring and primary physicians, informed about the care of your patient. Please feel free to contact me should you have any questions.    The 21st Century Cures Act makes medical notes like these available to patients in the interest of transparency. Please be advised this is a medical document. Medical documents are intended to carry relevant information, facts as evident, and the clinical opinion of the practitioner. The medical note is intended as peer to peer communication and may appear blunt or direct. It is written in medical language and may contain abbreviations or verbiage that are unfamiliar.   Kayce Patterson MD

## 2025-01-23 ENCOUNTER — TELEPHONE (OUTPATIENT)
Dept: INTERNAL MEDICINE CLINIC | Facility: CLINIC | Age: 40
End: 2025-01-23

## 2025-01-23 NOTE — TELEPHONE ENCOUNTER
Spoke to patient, full name and date of birth verified.  Patient states he was seen at an Immediate Care 2-3 weeks ago and was diagnosed with double ear infection with both ear drums ruptured.    RN ran Care Everywhere update, unable to locate visit information.     Patient states he was prescribed 1 weeks' worth of oral antibiotics and ear drops.     It was recommended patient follow up with primary care or ENT.     Patient reports his ears are feeling better, he still has a small amount of drainage and itching, but no pain at this time.     Patient accepted 1st available appointment with Dr. Foreman at [facility], Dr. Yarbrough has no open appointments until 2/4/25.

## 2025-01-25 ENCOUNTER — OFFICE VISIT (OUTPATIENT)
Dept: INTERNAL MEDICINE CLINIC | Facility: CLINIC | Age: 40
End: 2025-01-25

## 2025-01-25 VITALS
DIASTOLIC BLOOD PRESSURE: 67 MMHG | BODY MASS INDEX: 40.43 KG/M2 | WEIGHT: 315 LBS | SYSTOLIC BLOOD PRESSURE: 100 MMHG | HEART RATE: 76 BPM | HEIGHT: 74 IN

## 2025-01-25 DIAGNOSIS — H60.93 RECURRENT OTITIS EXTERNA OF BOTH EARS: Primary | ICD-10-CM

## 2025-01-25 DIAGNOSIS — H69.93 DYSFUNCTION OF BOTH EUSTACHIAN TUBES: ICD-10-CM

## 2025-01-25 DIAGNOSIS — H65.193 OTHER NON-RECURRENT ACUTE NONSUPPURATIVE OTITIS MEDIA OF BOTH EARS: ICD-10-CM

## 2025-01-25 PROCEDURE — 99214 OFFICE O/P EST MOD 30 MIN: CPT | Performed by: STUDENT IN AN ORGANIZED HEALTH CARE EDUCATION/TRAINING PROGRAM

## 2025-01-25 PROCEDURE — 3078F DIAST BP <80 MM HG: CPT | Performed by: STUDENT IN AN ORGANIZED HEALTH CARE EDUCATION/TRAINING PROGRAM

## 2025-01-25 PROCEDURE — 3008F BODY MASS INDEX DOCD: CPT | Performed by: STUDENT IN AN ORGANIZED HEALTH CARE EDUCATION/TRAINING PROGRAM

## 2025-01-25 PROCEDURE — 3074F SYST BP LT 130 MM HG: CPT | Performed by: STUDENT IN AN ORGANIZED HEALTH CARE EDUCATION/TRAINING PROGRAM

## 2025-01-25 RX ORDER — TRAZODONE HYDROCHLORIDE 150 MG/1
150 TABLET ORAL NIGHTLY
COMMUNITY
Start: 2025-01-13

## 2025-01-25 RX ORDER — FLUTICASONE PROPIONATE 50 MCG
2 SPRAY, SUSPENSION (ML) NASAL DAILY
Qty: 1 EACH | Refills: 11 | Status: SHIPPED | OUTPATIENT
Start: 2025-01-25

## 2025-01-25 RX ORDER — OFLOXACIN 3 MG/ML
SOLUTION AURICULAR (OTIC)
COMMUNITY
Start: 2024-12-30 | End: 2025-01-25

## 2025-01-25 RX ORDER — AZELASTINE HYDROCHLORIDE 137 UG/1
1-2 SPRAY, METERED NASAL 2 TIMES DAILY
Qty: 30 ML | Refills: 11 | Status: SHIPPED | OUTPATIENT
Start: 2025-01-25

## 2025-01-25 RX ORDER — VENLAFAXINE HYDROCHLORIDE 75 MG/1
75 CAPSULE, EXTENDED RELEASE ORAL DAILY
COMMUNITY
Start: 2025-01-24

## 2025-01-25 RX ORDER — OFLOXACIN 3 MG/ML
10 SOLUTION AURICULAR (OTIC) 2 TIMES DAILY
Qty: 10 ML | Refills: 0 | Status: SHIPPED | OUTPATIENT
Start: 2025-01-25

## 2025-01-25 RX ORDER — CLONAZEPAM 2 MG/1
TABLET ORAL
COMMUNITY
Start: 2025-01-13

## 2025-01-25 NOTE — PROGRESS NOTES
OFFICE NOTE     Patient ID: Jac Bess is a 39 year old male.  Today's Date: 01/25/25  Chief Complaint: Ear Problem (B/l ear itching, no pain, frequent ear issues was seen by IC couple weeks ago)    Pt is a 38y/o male with PMHx of CKD followed by nephrology Dr. Warner, left adrenal mass (followed by Endocrine Dr. Patterson), THALIA/MDD, low testosterone, history of DVT on long term anticoagulation, chronic back pain on methadone, Class 3 obesity BMI 47.9 NIKOS  whom presents with ongoing double ear infection. Pt complaining of bilateral ear itching, has drainage. Was seen in urgent care 4 weeks ago had ear drainage, and     Was seen in urgent care a few weeks ago and given ear drops.       Vitals:    01/25/25 0806   BP: 100/67   Pulse: 76   Weight: (!) 374 lb (169.6 kg)   Height: 6' 2\" (1.88 m)     body mass index is 48.02 kg/m².  BP Readings from Last 3 Encounters:   01/25/25 100/67   01/21/25 124/84   11/05/24 102/70     The ASCVD Risk score (Charles DK, et al., 2019) failed to calculate for the following reasons:    The 2019 ASCVD risk score is only valid for ages 40 to 79      Medications reviewed:  Current Outpatient Medications   Medication Sig Dispense Refill    KLONOPIN 2 MG Oral Tab       traZODone 150 MG Oral Tab Take 1 tablet (150 mg total) by mouth nightly. TAKE AT BEDTIME      venlafaxine ER 75 MG Oral Capsule SR 24 Hr Take 1 capsule (75 mg total) by mouth daily. Takes 3 tablets total 250mg daily      amoxicillin clavulanate 875-125 MG Oral Tab Take 1 tablet by mouth 2 (two) times daily for 10 days. 20 tablet 0    azelastine 137 MCG/SPRAY Nasal Solution 1-2 sprays by Nasal route in the morning and 1-2 sprays before bedtime. FOR SINUS SYMPTOMS/NASAL CONGESTION.. 30 mL 11    fluticasone propionate 50 MCG/ACT Nasal Suspension 2 sprays by Each Nare route daily. FOR NASAL CONGESTION/SINUS SYMPTOMS. 1 each 11    ofloxacin 0.3 % Otic Solution Place 10 drops into both ears 2 (two) times daily. 10  mL 0    methadone 5 MG Oral Tab Take 29 tablets (145 mg total) by mouth every morning.      ergocalciferol 1.25 MG (08786 UT) Oral Cap Take 1 capsule (50,000 Units total) by mouth once a week.      Omega-3 Fatty Acids (FISH OIL) 300 MG Oral Cap Take by mouth.      fenofibrate micronized 134 MG Oral Cap Take 1 capsule (134 mg total) by mouth daily with food.      alprazolam 2 MG Oral Tab       busPIRone 15 MG Oral Tab Take 1 tablet (15 mg total) by mouth in the morning and 1 tablet (15 mg total) before bedtime.      ARIpiprazole 5 MG Oral Tab Take 1 tablet (5 mg total) by mouth daily.           Assessment & Plan    1. Recurrent otitis externa of both ears (Primary)  -     Cancel: ENT - INTERNAL  -     Ofloxacin; Place 10 drops into both ears 2 (two) times daily.  Dispense: 10 mL; Refill: 0  -     ENT - INTERNAL  2. Dysfunction of both eustachian tubes  -     Cancel: ENT - INTERNAL  -     Amoxicillin-Pot Clavulanate; Take 1 tablet by mouth 2 (two) times daily for 10 days.  Dispense: 20 tablet; Refill: 0  -     Azelastine HCl; 1-2 sprays by Nasal route in the morning and 1-2 sprays before bedtime. FOR SINUS SYMPTOMS/NASAL CONGESTION..  Dispense: 30 mL; Refill: 11  -     Fluticasone Propionate; 2 sprays by Each Nare route daily. FOR NASAL CONGESTION/SINUS SYMPTOMS.  Dispense: 1 each; Refill: 11  -     ENT - INTERNAL  3. Other non-recurrent acute nonsuppurative otitis media of both ears  -     Cancel: ENT - INTERNAL  -     Amoxicillin-Pot Clavulanate; Take 1 tablet by mouth 2 (two) times daily for 10 days.  Dispense: 20 tablet; Refill: 0  -     Azelastine HCl; 1-2 sprays by Nasal route in the morning and 1-2 sprays before bedtime. FOR SINUS SYMPTOMS/NASAL CONGESTION..  Dispense: 30 mL; Refill: 11  -     Fluticasone Propionate; 2 sprays by Each Nare route daily. FOR NASAL CONGESTION/SINUS SYMPTOMS.  Dispense: 1 each; Refill: 11  -     ENT - INTERNAL  Pt with bilateral otitis externa based on history and clinical  exam  Plan  -start Ciprofloxacin BID for 7-10 days,  -start augmentin BID for 10 days  -start flonase and azelastine 1-2 puffs BID   Pt told given chronic ear issues to follow up with ENT    Follow Up: As needed/if symptoms worsen or No follow-ups on file..     Objective/ Results:   Physical Exam  Constitutional:       Appearance: Normal appearance.   HENT:      Right Ear: Tenderness present. A middle ear effusion is present. Tympanic membrane is scarred.      Left Ear: Tenderness present. A middle ear effusion is present. Tympanic membrane is scarred.      Nose: Congestion and rhinorrhea present.      Right Turbinates: Enlarged and swollen.      Left Turbinates: Enlarged and swollen.   Cardiovascular:      Rate and Rhythm: Normal rate and regular rhythm.   Pulmonary:      Effort: Pulmonary effort is normal.      Breath sounds: Normal breath sounds.   Neurological:      Mental Status: He is alert.        Reviewed:    Patient Active Problem List    Diagnosis    Class 3 severe obesity with body mass index (BMI) of 45.0 to 49.9 in adult (HCC)    Blood thinned due to long-term anticoagulant use    History of DVT (deep vein thrombosis)    Alopecia    Hematochezia    Chronic nonintractable headache    Neck pain    Anxiety and depression    Skin lesion of scalp      Allergies[1]     Social History     Socioeconomic History    Marital status: Single   Tobacco Use    Smoking status: Former     Current packs/day: 0.50     Average packs/day: 0.5 packs/day for 3.1 years (1.5 ttl pk-yrs)     Types: Cigarettes     Start date: 1/1/2022     Passive exposure: Past    Smokeless tobacco: Never   Vaping Use    Vaping status: Every Day    Substances: Nicotine, Flavoring    Devices: Disposable   Substance and Sexual Activity    Alcohol use: No    Drug use: Not Currently     Types: Opiods     Comment: off drugs for 10 years      Review of Systems   Constitutional: Negative.    HENT:  Positive for ear discharge, postnasal drip, rhinorrhea  and sinus pressure. Negative for ear pain.    Eyes: Negative.    Respiratory: Negative.     Cardiovascular: Negative.    Gastrointestinal: Negative.    Genitourinary: Negative.    Musculoskeletal: Negative.    Skin: Negative.    Neurological: Negative.    Psychiatric/Behavioral: Negative.       All other systems negative unless otherwise stated in ROS or HPI above.       Jesús Foreman MD  Internal Medicine       Call office with any questions or seek emergency care if necessary.   Patient understands and agrees to follow directions and advice.      ----------------------------------------- PATIENT INSTRUCTIONS-----------------------------------------     There are no Patient Instructions on file for this visit.        The 21st Century Cures Act makes medical notes available to patients in the interest of transparency.  However, please be advised that this is a medical document.  It is intended as a peer to peer communication.  It is written in medical language and may contain abbreviations or verbiage that are technical and unfamiliar.  It may appear blunt or direct.  Medical documents are intended to carry relevant information, facts as evident, and the clinical opinion of the practitioner.          [1]   Allergies  Allergen Reactions    Seasonal OTHER (SEE COMMENTS)     Sneezing and runny nose.

## 2025-02-07 ENCOUNTER — EKG ENCOUNTER (OUTPATIENT)
Dept: LAB | Age: 40
End: 2025-02-07
Attending: STUDENT IN AN ORGANIZED HEALTH CARE EDUCATION/TRAINING PROGRAM
Payer: COMMERCIAL

## 2025-02-07 ENCOUNTER — OFFICE VISIT (OUTPATIENT)
Dept: INTERNAL MEDICINE CLINIC | Facility: CLINIC | Age: 40
End: 2025-02-07

## 2025-02-07 VITALS
SYSTOLIC BLOOD PRESSURE: 104 MMHG | WEIGHT: 315 LBS | BODY MASS INDEX: 40.43 KG/M2 | HEIGHT: 74 IN | HEART RATE: 120 BPM | DIASTOLIC BLOOD PRESSURE: 70 MMHG

## 2025-02-07 DIAGNOSIS — F32.A ANXIETY AND DEPRESSION: Chronic | ICD-10-CM

## 2025-02-07 DIAGNOSIS — E55.9 VITAMIN D DEFICIENCY: ICD-10-CM

## 2025-02-07 DIAGNOSIS — E23.7 PITUITARY DISEASE (HCC): ICD-10-CM

## 2025-02-07 DIAGNOSIS — E78.1 HYPERTRIGLYCERIDEMIA: ICD-10-CM

## 2025-02-07 DIAGNOSIS — Z79.01 BLOOD THINNED DUE TO LONG-TERM ANTICOAGULANT USE: Chronic | ICD-10-CM

## 2025-02-07 DIAGNOSIS — Z00.00 ANNUAL PHYSICAL EXAM: ICD-10-CM

## 2025-02-07 DIAGNOSIS — Z82.49 FAMILY HISTORY OF EARLY CAD: ICD-10-CM

## 2025-02-07 DIAGNOSIS — E66.813 CLASS 3 SEVERE OBESITY DUE TO EXCESS CALORIES WITH SERIOUS COMORBIDITY AND BODY MASS INDEX (BMI) OF 45.0 TO 49.9 IN ADULT (HCC): Chronic | ICD-10-CM

## 2025-02-07 DIAGNOSIS — Z86.718 HISTORY OF DVT (DEEP VEIN THROMBOSIS): Chronic | ICD-10-CM

## 2025-02-07 DIAGNOSIS — G47.33 OSA (OBSTRUCTIVE SLEEP APNEA): ICD-10-CM

## 2025-02-07 DIAGNOSIS — F41.9 ANXIETY AND DEPRESSION: Chronic | ICD-10-CM

## 2025-02-07 DIAGNOSIS — Z23 IMMUNIZATION DUE: ICD-10-CM

## 2025-02-07 DIAGNOSIS — G47.00 INSOMNIA, UNSPECIFIED TYPE: ICD-10-CM

## 2025-02-07 DIAGNOSIS — H60.93 RECURRENT OTITIS EXTERNA OF BOTH EARS: ICD-10-CM

## 2025-02-07 DIAGNOSIS — E66.01 MORBID OBESITY WITH BMI OF 45.0-49.9, ADULT (HCC): ICD-10-CM

## 2025-02-07 DIAGNOSIS — Z00.00 ANNUAL PHYSICAL EXAM: Primary | ICD-10-CM

## 2025-02-07 DIAGNOSIS — E66.01 CLASS 3 SEVERE OBESITY DUE TO EXCESS CALORIES WITH SERIOUS COMORBIDITY AND BODY MASS INDEX (BMI) OF 45.0 TO 49.9 IN ADULT (HCC): Chronic | ICD-10-CM

## 2025-02-07 LAB
ALBUMIN SERPL-MCNC: 4.5 G/DL (ref 3.2–4.8)
ALBUMIN/GLOB SERPL: 1.7 {RATIO} (ref 1–2)
ALP LIVER SERPL-CCNC: 63 U/L
ALT SERPL-CCNC: 52 U/L
ANION GAP SERPL CALC-SCNC: 7 MMOL/L (ref 0–18)
AST SERPL-CCNC: 30 U/L (ref ?–34)
BASOPHILS # BLD AUTO: 0.04 X10(3) UL (ref 0–0.2)
BASOPHILS NFR BLD AUTO: 0.6 %
BILIRUB SERPL-MCNC: 0.2 MG/DL (ref 0.3–1.2)
BUN BLD-MCNC: 20 MG/DL (ref 9–23)
BUN/CREAT SERPL: 16.3 (ref 10–20)
CALCIUM BLD-MCNC: 9.2 MG/DL (ref 8.7–10.4)
CHLORIDE SERPL-SCNC: 108 MMOL/L (ref 98–112)
CHOLEST SERPL-MCNC: 159 MG/DL (ref ?–200)
CO2 SERPL-SCNC: 31 MMOL/L (ref 21–32)
CREAT BLD-MCNC: 1.23 MG/DL
DEPRECATED RDW RBC AUTO: 41.1 FL (ref 35.1–46.3)
EGFRCR SERPLBLD CKD-EPI 2021: 77 ML/MIN/1.73M2 (ref 60–?)
EOSINOPHIL # BLD AUTO: 0.11 X10(3) UL (ref 0–0.7)
EOSINOPHIL NFR BLD AUTO: 1.8 %
ERYTHROCYTE [DISTWIDTH] IN BLOOD BY AUTOMATED COUNT: 12.8 % (ref 11–15)
EST. AVERAGE GLUCOSE BLD GHB EST-MCNC: 117 MG/DL (ref 68–126)
FASTING PATIENT LIPID ANSWER: NO
FASTING STATUS PATIENT QL REPORTED: NO
GLOBULIN PLAS-MCNC: 2.6 G/DL (ref 2–3.5)
GLUCOSE BLD-MCNC: 143 MG/DL (ref 70–99)
HBA1C MFR BLD: 5.7 % (ref ?–5.7)
HCT VFR BLD AUTO: 48 %
HDLC SERPL-MCNC: 33 MG/DL (ref 40–59)
HGB BLD-MCNC: 15.5 G/DL
IMM GRANULOCYTES # BLD AUTO: 0.03 X10(3) UL (ref 0–1)
IMM GRANULOCYTES NFR BLD: 0.5 %
LDLC SERPL CALC-MCNC: 75 MG/DL (ref ?–100)
LYMPHOCYTES # BLD AUTO: 1.53 X10(3) UL (ref 1–4)
LYMPHOCYTES NFR BLD AUTO: 24.4 %
MCH RBC QN AUTO: 28.4 PG (ref 26–34)
MCHC RBC AUTO-ENTMCNC: 32.3 G/DL (ref 31–37)
MCV RBC AUTO: 88.1 FL
MONOCYTES # BLD AUTO: 0.43 X10(3) UL (ref 0.1–1)
MONOCYTES NFR BLD AUTO: 6.9 %
NEUTROPHILS # BLD AUTO: 4.13 X10 (3) UL (ref 1.5–7.7)
NEUTROPHILS # BLD AUTO: 4.13 X10(3) UL (ref 1.5–7.7)
NEUTROPHILS NFR BLD AUTO: 65.8 %
NONHDLC SERPL-MCNC: 126 MG/DL (ref ?–130)
OSMOLALITY SERPL CALC.SUM OF ELEC: 307 MOSM/KG (ref 275–295)
PLATELET # BLD AUTO: 180 10(3)UL (ref 150–450)
POTASSIUM SERPL-SCNC: 4.3 MMOL/L (ref 3.5–5.1)
PROT SERPL-MCNC: 7.1 G/DL (ref 5.7–8.2)
RBC # BLD AUTO: 5.45 X10(6)UL
SODIUM SERPL-SCNC: 146 MMOL/L (ref 136–145)
T4 FREE SERPL-MCNC: 1.2 NG/DL (ref 0.8–1.7)
TRIGL SERPL-MCNC: 318 MG/DL (ref 30–149)
TSI SER-ACNC: 1.5 UIU/ML (ref 0.55–4.78)
VIT D+METAB SERPL-MCNC: 17.1 NG/ML (ref 30–100)
VLDLC SERPL CALC-MCNC: 50 MG/DL (ref 0–30)
WBC # BLD AUTO: 6.3 X10(3) UL (ref 4–11)

## 2025-02-07 PROCEDURE — 93005 ELECTROCARDIOGRAM TRACING: CPT

## 2025-02-07 PROCEDURE — 83036 HEMOGLOBIN GLYCOSYLATED A1C: CPT

## 2025-02-07 PROCEDURE — 84439 ASSAY OF FREE THYROXINE: CPT

## 2025-02-07 PROCEDURE — 80061 LIPID PANEL: CPT

## 2025-02-07 PROCEDURE — 84443 ASSAY THYROID STIM HORMONE: CPT

## 2025-02-07 PROCEDURE — 36415 COLL VENOUS BLD VENIPUNCTURE: CPT

## 2025-02-07 PROCEDURE — 85025 COMPLETE CBC W/AUTO DIFF WBC: CPT

## 2025-02-07 PROCEDURE — 80053 COMPREHEN METABOLIC PANEL: CPT

## 2025-02-07 PROCEDURE — 93010 ELECTROCARDIOGRAM REPORT: CPT | Performed by: INTERNAL MEDICINE

## 2025-02-07 PROCEDURE — 82306 VITAMIN D 25 HYDROXY: CPT

## 2025-02-07 RX ORDER — OFLOXACIN 3 MG/ML
10 SOLUTION AURICULAR (OTIC) 2 TIMES DAILY
Qty: 10 ML | Refills: 2 | Status: SHIPPED | OUTPATIENT
Start: 2025-02-07

## 2025-02-07 RX ORDER — FENOFIBRATE 134 MG/1
134 CAPSULE ORAL
Qty: 90 CAPSULE | Refills: 3 | Status: SHIPPED | OUTPATIENT
Start: 2025-02-07 | End: 2026-02-02

## 2025-02-07 NOTE — PROGRESS NOTES
History of Present Illness   Patient ID: Jac Bess is a 39 year old male.  Chief Complaint: Physical      Jac Bess is a pleasant 39 year old male with  PMHx of CKD followed by nephrology Dr. Warner, left adrenal mass (followed by Endocrine Dr. Patterson), THALIA/MDD, low testosterone, history of DVT on long term anticoagulation, chronic back pain on methadone, Class 3 obesity BMI 47.9 NIKOS who presents for annual physical exam.       Anxiety/depression  [facility] ([facility] program psyhc for medications and counselors).  thalia 7 - 13  phq 9 - 7    Health Maintenance  - All care gaps addressed with patient.   Health Maintenance Due   Topic Date Due    Annual Physical  Never done    Annual Depression Screening  01/01/2025    Tobacco Cessation Counseling  Never done       Review of Systems  Review of Systems   Constitutional: Negative.    HENT:  Positive for congestion, postnasal drip and tinnitus.    Respiratory: Negative.     Cardiovascular: Negative.    Gastrointestinal: Negative.    Skin: Negative.    Neurological: Negative.        Physical Exam  Vitals:    02/07/25 1311   BP: 104/70   Pulse: 120   Weight: (!) 370 lb (167.8 kg)   Height: 6' 2\" (1.88 m)     Body mass index is 47.51 kg/m².  BP Readings from Last 3 Encounters:   02/07/25 104/70   01/25/25 100/67   01/21/25 124/84     Physical Exam  Constitutional:       Appearance: He is well-developed.   Cardiovascular:      Rate and Rhythm: Normal rate and regular rhythm.      Heart sounds: Normal heart sounds.   Pulmonary:      Effort: Pulmonary effort is normal.      Breath sounds: Normal breath sounds.   Abdominal:      General: Bowel sounds are normal.      Palpations: Abdomen is soft.   Skin:     General: Skin is warm and dry.   Neurological:      Mental Status: He is alert and oriented to person, place, and time.      Deep Tendon Reflexes: Reflexes are normal and symmetric.           Labs & Imaging  Pertinent labs and imaging  reviewed.   Lab Results   Component Value Date     (H) 12/24/2024    BUN 19 12/24/2024    BUNCREA 15.2 12/24/2024    CREATSERUM 1.25 12/24/2024    ANIONGAP 11 12/24/2024    GFRNAA 81 06/02/2021    GFRAA 94 06/02/2021    CA 9.7 12/24/2024    OSMOCALC 296 (H) 12/24/2024    ALKPHO 62 12/24/2024    AST 20 12/24/2024    ALT 26 12/24/2024    BILT 0.3 12/24/2024    TP 7.6 12/24/2024    TP 7.1 12/24/2024    ALB 4.8 12/24/2024    ALB 4.8 12/24/2024    ALB 4.33 12/24/2024     12/24/2024    K 3.5 12/24/2024     12/24/2024    CO2 26.0 12/24/2024     No results found for: \"EAG\", \"A1C\"  Lab Results   Component Value Date    WBC 6.9 12/24/2024    RBC 5.71 (H) 12/24/2024    HGB 15.8 12/24/2024    HCT 48.7 12/24/2024    MCV 85.3 12/24/2024    MCH 27.7 12/24/2024    MCHC 32.4 12/24/2024    RDW 12.8 12/24/2024    .0 12/24/2024     No results found for: \"CHOLEST\", \"TRIG\", \"HDL\", \"LDL\", \"VLDL\", \"TCHDLRATIO\", \"NONHDLC\", \"CHOLHDLRATIO\", \"CALCNONHDL\"  The ASCVD Risk score (Charles DK, et al., 2019) failed to calculate for the following reasons:    The 2019 ASCVD risk score is only valid for ages 40 to 79    Medical History    Reviewed allergies:  Allergies[1]     Reviewed:  Patient Active Problem List    Diagnosis    Class 3 severe obesity with body mass index (BMI) of 45.0 to 49.9 in adult (HCC)    Blood thinned due to long-term anticoagulant use    History of DVT (deep vein thrombosis)    Alopecia    Hematochezia    Chronic nonintractable headache    Neck pain    Anxiety and depression    Skin lesion of scalp      Reviewed:  Past Medical History:    Anxiety    Anxiety state    and panic disorder    Back problem    Depression    Disorder of liver    fatty liver    DVT of lower extremity (deep venous thrombosis) (HCC)    left leg    Hx of motion sickness    Migraines    Obesity    Panic disorder      Reviewed:  Family History   Problem Relation Age of Onset    Heart Disorder Father         CAD s/p stent 50s     Heart Attack Father     Anxiety Mother     Heart Attack Paternal Grandfather     No Known Problems Sister     No Known Problems Brother        Reviewed:  Past Surgical History:   Procedure Laterality Date    Removal gallbladder      2020      Reviewed:  Social History     Socioeconomic History    Marital status: Single   Tobacco Use    Smoking status: Former     Current packs/day: 0.50     Average packs/day: 0.5 packs/day for 3.1 years (1.6 ttl pk-yrs)     Types: Cigarettes     Start date: 1/1/2022     Passive exposure: Past    Smokeless tobacco: Current    Tobacco comments:     Uses zyn pouches -nicotine pouches   Vaping Use    Vaping status: Every Day    Substances: Nicotine, Flavoring    Devices: Disposable   Substance and Sexual Activity    Alcohol use: No    Drug use: Not Currently     Types: Opiods     Comment: off drugs for 10 years      Reviewed:  Current Outpatient Medications   Medication Sig Dispense Refill    ofloxacin 0.3 % Otic Solution Place 10 drops into both ears 2 (two) times daily. 10 mL 2    amoxicillin clavulanate 875-125 MG Oral Tab Take 1 tablet by mouth 2 (two) times daily for 14 days. 28 tablet 0    fenofibrate micronized 134 MG Oral Cap Take 1 capsule (134 mg total) by mouth daily with food. 90 capsule 3    KLONOPIN 2 MG Oral Tab Take 1 tablet (2 mg total) by mouth in the morning and 1 tablet (2 mg total) before bedtime.      traZODone 150 MG Oral Tab Take 1 tablet (150 mg total) by mouth nightly. TAKE AT BEDTIME      venlafaxine ER 75 MG Oral Capsule SR 24 Hr Take 1 capsule (75 mg total) by mouth daily. Takes 3 tablets total 225 mg daily      azelastine 137 MCG/SPRAY Nasal Solution 1-2 sprays by Nasal route in the morning and 1-2 sprays before bedtime. FOR SINUS SYMPTOMS/NASAL CONGESTION.. 30 mL 11    fluticasone propionate 50 MCG/ACT Nasal Suspension 2 sprays by Each Nare route daily. FOR NASAL CONGESTION/SINUS SYMPTOMS. 1 each 11    methadone 5 MG Oral Tab Take 25 tablets (125 mg total)  by mouth every morning.      ergocalciferol 1.25 MG (60373 UT) Oral Cap Take 1 capsule (50,000 Units total) by mouth once a week.      Omega-3 Fatty Acids (FISH OIL) 300 MG Oral Cap Take 2 capsules by mouth daily.      alprazolam 2 MG Oral Tab Take 1 tablet (2 mg total) by mouth daily as needed.            Assessment & Plan    1. Annual physical exam  - EKG 12 Lead; Future  - Lipid Panel; Future  - TSH W Reflex To Free T4; Future  - Hemoglobin A1C; Future  - Comp Metabolic Panel (14); Future  - CBC With Differential With Platelet; Future  - Vitamin D; Future  Patient here for physical exam and due for following.  Plan:  -order the following Labs: CBC, CMP, Lipid Panel, A1C, TSH, Vitamin D,  -Obtain Baseline EKG given family history of early CAD/Atherosclerosis  -I recommend to eat a more balanced mediterranean diet (eat more vegetables and fruits) more omega 3 fatty acids, lean protein (chicken, turkey, seafood), less process/fried fatty foods, less red met, and mixed aerobic exercise 30 minutes a day and intermittent strength training.        2. History of DVT (deep vein thrombosis)  On eliquis continue    3. Blood thinned due to long-term anticoagulant use  On eliquis continue    4. Class 3 severe obesity due to excess calories with serious comorbidity and body mass index (BMI) of 45.0 to 49.9 in adult (HCC)  I recommend to eat a more balanced mediterranean diet (eat more vegetables and fruits) more omega 3 fatty acids, lean protein (chicken, turkey, seafood), less process/fried fatty foods, less red met, and mixed aerobic exercise 30 minutes a day and intermittent strength training.      5. Anxiety and depression  - [facility] Referral - In Network  [facility] ([facility] program psy for medications and counselors).    6. Family history of early CAD  - EKG 12 Lead; Future  Baseline EKG ordered    7. Vitamin D deficiency  - Vitamin D; Future  Pt with Vitamin D def due for  screening:  Plan;  -Check vitamin D level, depending on level will give guidance on what dose to recommend per guidelines.      8. Immunization due  - HEPATITIS B VACCINE, OVER 20  Hep B vaccine    9. Insomnia, unspecified type  Continue trazodone 150mg po nightly    10. NIKOS (obstructive sleep apnea)  Pt pending sleep study    11. Recurrent otitis externa of both ears  - ofloxacin 0.3 % Otic Solution; Place 10 drops into both ears 2 (two) times daily.  Dispense: 10 mL; Refill: 2  - amoxicillin clavulanate 875-125 MG Oral Tab; Take 1 tablet by mouth 2 (two) times daily for 14 days.  Dispense: 28 tablet; Refill: 0  Recurrent otitis externa  Plan  -continue ofloxacin drops and augmentin BID for 10 day course  -follow up with ENT if no improvement    12. Hypertriglyceridemia  - fenofibrate micronized 134 MG Oral Cap; Take 1 capsule (134 mg total) by mouth daily with food.  Dispense: 90 capsule; Refill: 3  TGL  Plan  Continue fenofibrate    13. Morbid obesity with BMI of 45.0-49.9, adult (HCC)  I recommend to eat a more balanced mediterranean diet (eat more vegetables and fruits) more omega 3 fatty acids, lean protein (chicken, turkey, seafood), less process/fried fatty foods, less red met, and mixed aerobic exercise 30 minutes a day and intermittent strength training.            Follow Up:   No follow-ups on file.      Jesús Foreman MD  Internal Medicine       Patient asked to sign release of information for outside records if not already requested, make future office/imaging appointments at the  prior to leaving, and to sign up for [facility] if not already active.  Preventive measures and further education discussed with patient as per after visit summary. Potential medication side effects discussed. All questions answered to best of ability.   Call office with any questions. Seek emergency care if necessary.   Patient understands and agrees to follow directions and  advice.      ----------------------------------------- PATIENT INSTRUCTIONS-----------------------------------------     There are no Patient Instructions on file for this visit.        The 21st Century Cures Act makes medical notes available to patients in the interest of transparency.  However, please be advised that this is a medical document.  It is intended as a peer to peer communication.  It is written in medical language and may contain abbreviations or verbiage that are technical and unfamiliar.  It may appear blunt or direct.  Medical documents are intended to carry relevant information, facts as evident, and the clinical opinion of the practitioner.          [1]   Allergies  Allergen Reactions    Seasonal OTHER (SEE COMMENTS)     Sneezing and runny nose.

## 2025-02-08 DIAGNOSIS — E78.1 HYPERTRIGLYCERIDEMIA: Primary | ICD-10-CM

## 2025-02-08 LAB
ATRIAL RATE: 101 BPM
P AXIS: 33 DEGREES
P-R INTERVAL: 156 MS
Q-T INTERVAL: 348 MS
QRS DURATION: 82 MS
QTC CALCULATION (BEZET): 451 MS
R AXIS: -50 DEGREES
T AXIS: 17 DEGREES
VENTRICULAR RATE: 101 BPM

## 2025-02-08 NOTE — PROGRESS NOTES
Please relay to pt if not read:    Hello There!     Dr. Foreman here, I have reviewed your labs here are your recommendations:    # Lipids/cholesterol: Your Triglycerides are still elevated please make sure to continue fenofibrate and low fat diet.     The ASCVD Risk score (Charles HEAD, et al., 2019) failed to calculate for the following reasons:    The 2019 ASCVD risk score is only valid for ages 40 to 79    # Diabetes/A1C: Your A1C Last A1c value was 5.7% done 2/7/2025. which shows  prediabetes. This does not require medications unless your A1C reaches greater than 6.5, but if you would like to start medications to prevent the progression to diabetes please let me know. I would recommend increasing exercise and/or reducing your intake of carbohydrates, pastas, sweets, and sugary drinks/etc, drink more water, eat more vegetables instead of fruit, and try to lose 10% of your current bodyweight.  We will recheck your A1C in 3 months, please schedule a follow up office visit so we can recheck your A1C in the office using a finger stick test. You do not need to fast. If you prefer a video visit let me know so we can order a lab draw A1C/metabolic panel to be completed 3 days prior to our visit.     # TSH: Your thyroid (TSH) function is normal.     # CMP: Your comprehensive metabolic panel shows overall stable functioning kidneys (creatinine, GFR), liver (AST, ALT, Bilirubin), and electrolytes (sodium, potassium, calcium). Slight variations in other values such as BUN/Creat, Serum Osm, anion gap, chloride, etc are not of clinical value at this time.     # CBC: Your complete blood count shows overall stable red blood cells, white blood cells, platelets (help you stop bleeding), and hematocrit (thickness of blood),  Slight variations in other values such as RDW/sw, MCH are not of clinical value at this time.     # Vitamins: Your vitamin D level is Vitamin D Insufficiency (<30ng/mL) please start 2,000 International Units  daily (it is cheaper to purchase from [facility] or your local pharmacy rather than sending a prescription in). will recheck with next annual physical or sooner if clinically indicated      If you have any questions or concerns in regards to these labs please schedule a follow up and will gladly discuss.   -Dr. Foreman

## 2025-02-09 NOTE — PROGRESS NOTES
Please relay to pt if not read:    Sultana Nathan,   Your EKG shows borderline elevated heart rate, and a slight variant in the axis of your EKG and might be related to lung disease. Please continue with your specialist for further evaluation  -Dr. Foreman

## 2025-02-12 ENCOUNTER — TELEPHONE (OUTPATIENT)
Age: 40
End: 2025-02-12

## 2025-02-12 ENCOUNTER — ANESTHESIA (OUTPATIENT)
Dept: MRI IMAGING | Facility: HOSPITAL | Age: 40
End: 2025-02-12
Payer: COMMERCIAL

## 2025-02-12 ENCOUNTER — HOSPITAL ENCOUNTER (OUTPATIENT)
Dept: MRI IMAGING | Facility: HOSPITAL | Age: 40
Discharge: HOME | End: 2025-02-12
Attending: INTERNAL MEDICINE
Payer: COMMERCIAL

## 2025-02-12 ENCOUNTER — ANESTHESIA EVENT (OUTPATIENT)
Dept: MRI IMAGING | Facility: HOSPITAL | Age: 40
End: 2025-02-12
Payer: COMMERCIAL

## 2025-02-12 VITALS
SYSTOLIC BLOOD PRESSURE: 117 MMHG | DIASTOLIC BLOOD PRESSURE: 67 MMHG | OXYGEN SATURATION: 96 % | HEART RATE: 75 BPM | HEIGHT: 74 IN | BODY MASS INDEX: 40.43 KG/M2 | RESPIRATION RATE: 16 BRPM | WEIGHT: 315 LBS | TEMPERATURE: 99 F

## 2025-02-12 DIAGNOSIS — E23.7 PITUITARY DISEASE (HCC): ICD-10-CM

## 2025-02-12 PROCEDURE — A9575 INJ GADOTERATE MEGLUMI 0.1ML: HCPCS | Performed by: INTERNAL MEDICINE

## 2025-02-12 PROCEDURE — 70553 MRI BRAIN STEM W/O & W/DYE: CPT | Performed by: INTERNAL MEDICINE

## 2025-02-12 RX ORDER — GADOTERATE MEGLUMINE 376.9 MG/ML
20 INJECTION INTRAVENOUS
Status: COMPLETED | OUTPATIENT
Start: 2025-02-12 | End: 2025-02-12

## 2025-02-12 RX ORDER — HYDROMORPHONE HYDROCHLORIDE 1 MG/ML
0.4 INJECTION, SOLUTION INTRAMUSCULAR; INTRAVENOUS; SUBCUTANEOUS EVERY 5 MIN PRN
Status: DISCONTINUED | OUTPATIENT
Start: 2025-02-12 | End: 2025-02-14

## 2025-02-12 RX ORDER — FAMOTIDINE 20 MG/1
20 TABLET, FILM COATED ORAL ONCE
Status: COMPLETED | OUTPATIENT
Start: 2025-02-12 | End: 2025-02-12

## 2025-02-12 RX ORDER — MIDAZOLAM HYDROCHLORIDE 1 MG/ML
INJECTION INTRAMUSCULAR; INTRAVENOUS AS NEEDED
Status: DISCONTINUED | OUTPATIENT
Start: 2025-02-12 | End: 2025-02-12 | Stop reason: SURG

## 2025-02-12 RX ORDER — HYDROMORPHONE HYDROCHLORIDE 1 MG/ML
0.6 INJECTION, SOLUTION INTRAMUSCULAR; INTRAVENOUS; SUBCUTANEOUS EVERY 5 MIN PRN
Status: DISCONTINUED | OUTPATIENT
Start: 2025-02-12 | End: 2025-02-14

## 2025-02-12 RX ORDER — MIDAZOLAM HYDROCHLORIDE 1 MG/ML
INJECTION INTRAMUSCULAR; INTRAVENOUS
Status: DISCONTINUED
Start: 2025-02-12 | End: 2025-02-13

## 2025-02-12 RX ORDER — ACETAMINOPHEN 500 MG
1000 TABLET ORAL ONCE
Status: DISCONTINUED | OUTPATIENT
Start: 2025-02-12 | End: 2025-02-14

## 2025-02-12 RX ORDER — NALOXONE HYDROCHLORIDE 0.4 MG/ML
0.08 INJECTION, SOLUTION INTRAMUSCULAR; INTRAVENOUS; SUBCUTANEOUS AS NEEDED
Status: ACTIVE | OUTPATIENT
Start: 2025-02-12 | End: 2025-02-12

## 2025-02-12 RX ORDER — SODIUM CHLORIDE, SODIUM LACTATE, POTASSIUM CHLORIDE, CALCIUM CHLORIDE 600; 310; 30; 20 MG/100ML; MG/100ML; MG/100ML; MG/100ML
INJECTION, SOLUTION INTRAVENOUS CONTINUOUS
Status: DISCONTINUED | OUTPATIENT
Start: 2025-02-12 | End: 2025-02-14

## 2025-02-12 RX ORDER — HYDROMORPHONE HYDROCHLORIDE 1 MG/ML
0.2 INJECTION, SOLUTION INTRAMUSCULAR; INTRAVENOUS; SUBCUTANEOUS EVERY 5 MIN PRN
Status: DISCONTINUED | OUTPATIENT
Start: 2025-02-12 | End: 2025-02-14

## 2025-02-12 RX ORDER — MORPHINE SULFATE 4 MG/ML
4 INJECTION, SOLUTION INTRAMUSCULAR; INTRAVENOUS EVERY 10 MIN PRN
Status: DISCONTINUED | OUTPATIENT
Start: 2025-02-12 | End: 2025-02-14

## 2025-02-12 RX ORDER — MORPHINE SULFATE 4 MG/ML
2 INJECTION, SOLUTION INTRAMUSCULAR; INTRAVENOUS EVERY 10 MIN PRN
Status: DISCONTINUED | OUTPATIENT
Start: 2025-02-12 | End: 2025-02-14

## 2025-02-12 RX ORDER — MORPHINE SULFATE 10 MG/ML
6 INJECTION, SOLUTION INTRAMUSCULAR; INTRAVENOUS EVERY 10 MIN PRN
Status: DISCONTINUED | OUTPATIENT
Start: 2025-02-12 | End: 2025-02-14

## 2025-02-12 RX ORDER — FAMOTIDINE 10 MG/ML
20 INJECTION, SOLUTION INTRAVENOUS ONCE
Status: COMPLETED | OUTPATIENT
Start: 2025-02-12 | End: 2025-02-12

## 2025-02-12 RX ADMIN — MIDAZOLAM HYDROCHLORIDE 2 MG: 1 INJECTION INTRAMUSCULAR; INTRAVENOUS at 14:35:00

## 2025-02-12 RX ADMIN — GADOTERATE MEGLUMINE 20 ML: 376.9 INJECTION INTRAVENOUS at 14:58:00

## 2025-02-12 RX ADMIN — SODIUM CHLORIDE, SODIUM LACTATE, POTASSIUM CHLORIDE, CALCIUM CHLORIDE: 600; 310; 30; 20 INJECTION, SOLUTION INTRAVENOUS at 14:14:00

## 2025-02-12 RX ADMIN — FAMOTIDINE 20 MG: 20 TABLET, FILM COATED ORAL at 12:56:00

## 2025-02-12 RX ADMIN — MIDAZOLAM HYDROCHLORIDE 2 MG: 1 INJECTION INTRAMUSCULAR; INTRAVENOUS at 14:34:00

## 2025-02-12 RX ADMIN — SODIUM CHLORIDE, SODIUM LACTATE, POTASSIUM CHLORIDE, CALCIUM CHLORIDE: 600; 310; 30; 20 INJECTION, SOLUTION INTRAVENOUS at 13:04:00

## 2025-02-12 NOTE — ANESTHESIA PREPROCEDURE EVALUATION
Anesthesia PreOp Note    HPI:     Jac Bess is a 39 year old male who presents for preoperative consultation requested by: * No surgeons listed *    Date of Surgery: 2/12/2025    * No procedures listed *  Indication: * No pre-op diagnosis entered *    Relevant Problems   No relevant active problems       NPO:  Last Liquid Consumption Date: 02/12/25  Last Liquid Consumption Time: 0200 (water)  Last Solid Consumption Date: 02/12/25  Last Solid Consumption Time: 0200 (yogurt)  Last Liquid Consumption Date: 02/12/25          History Review:  Patient Active Problem List    Diagnosis Date Noted    Hypertriglyceridemia 02/08/2025    Class 3 severe obesity with body mass index (BMI) of 45.0 to 49.9 in adult (HCC) 07/11/2024    Blood thinned due to long-term anticoagulant use 07/11/2024    History of DVT (deep vein thrombosis) 07/11/2024    Alopecia 06/27/2019    Hematochezia 06/27/2019    Chronic nonintractable headache 04/27/2019    Neck pain 04/27/2019    Anxiety and depression 01/26/2016    Skin lesion of scalp 01/26/2016       Past Medical History:    Anxiety    Anxiety state    and panic disorder    Back problem    Depression    Disorder of liver    fatty liver    DVT of lower extremity (deep venous thrombosis) (HCC)    left leg    Hx of motion sickness    Hypertriglyceridemia    Migraines    Obesity    Panic disorder       Past Surgical History:   Procedure Laterality Date    Removal gallbladder      2020       Prescriptions Prior to Admission[1]  Current Medications and Prescriptions Ordered in Epic[2]    Allergies[3]    Family History   Problem Relation Age of Onset    Heart Disorder Father         CAD s/p stent 50s    Heart Attack Father     Anxiety Mother     Heart Attack Paternal Grandfather     No Known Problems Sister     No Known Problems Brother      Social History     Socioeconomic History    Marital status: Single   Tobacco Use    Smoking status: Former     Current packs/day: 0.50     Average  packs/day: 0.5 packs/day for 3.1 years (1.6 ttl pk-yrs)     Types: Cigarettes     Start date: 1/1/2022     Passive exposure: Past    Smokeless tobacco: Current    Tobacco comments:     Uses zyn pouches -nicotine pouches   Vaping Use    Vaping status: Every Day    Substances: Nicotine, Flavoring    Devices: Disposable   Substance and Sexual Activity    Alcohol use: No    Drug use: Not Currently     Types: Opiods     Comment: off drugs for 10 years       Available pre-op labs reviewed.  Lab Results   Component Value Date    WBC 6.3 02/07/2025    RBC 5.45 02/07/2025    HGB 15.5 02/07/2025    HCT 48.0 02/07/2025    MCV 88.1 02/07/2025    MCH 28.4 02/07/2025    MCHC 32.3 02/07/2025    RDW 12.8 02/07/2025    .0 02/07/2025     Lab Results   Component Value Date     (H) 02/07/2025    K 4.3 02/07/2025     02/07/2025    CO2 31.0 02/07/2025    BUN 20 02/07/2025    CREATSERUM 1.23 02/07/2025     (H) 02/07/2025    CA 9.2 02/07/2025          Vital Signs:  Body mass index is 45.71 kg/m².   height is 1.88 m (6' 2\") and weight is 161.5 kg (356 lb) (abnormal). His oral temperature is 98.1 °F (36.7 °C). His blood pressure is 101/83 and his pulse is 99. His respiration is 20 and oxygen saturation is 94%.   Vitals:    02/06/25 1403 02/12/25 1250   BP:  101/83   Pulse:  99   Resp:  20   Temp:  98.1 °F (36.7 °C)   TempSrc:  Oral   SpO2:  94%   Weight: (!) 164.2 kg (362 lb) (!) 161.5 kg (356 lb)   Height: 1.88 m (6' 2\") 1.88 m (6' 2\")        Anesthesia Evaluation     Patient summary reviewed and Nursing notes reviewed    Airway   Mallampati: III  TM distance: >3 FB  Neck ROM: full  Dental - Dentition appears grossly intact     Pulmonary - normal exam    breath sounds clear to auscultation  Cardiovascular   Exercise tolerance: poor    Rhythm: regular  Rate: normal    Neuro/Psych    (+)  anxiety/panic attacks,  depression      GI/Hepatic/Renal    (+) liver disease    Endo/Other    Abdominal   (+) obese                  Anesthesia Plan:   ASA:  3  Plan:   MAC  Informed Consent Plan and Risks Discussed With:  Patient      I have informed Jac Bess and/or legal guardian or family member of the nature of the anesthetic plan, benefits, risks including possible dental damage if relevant, major complications, and any alternative forms of anesthetic management.   All of the patient's questions were answered to the best of my ability. The patient desires the anesthetic management as planned.  Ryan Ventura MD  2/12/2025 2:28 PM  Present on Admission:  **None**           [1] (Not in a hospital admission)  [2]   Current Outpatient Medications Ordered in Epic   Medication Sig Dispense Refill    ofloxacin 0.3 % Otic Solution Place 10 drops into both ears 2 (two) times daily. 10 mL 2    amoxicillin clavulanate 875-125 MG Oral Tab Take 1 tablet by mouth 2 (two) times daily for 14 days. 28 tablet 0    fenofibrate micronized 134 MG Oral Cap Take 1 capsule (134 mg total) by mouth daily with food. 90 capsule 3    KLONOPIN 2 MG Oral Tab Take 1 tablet (2 mg total) by mouth in the morning and 1 tablet (2 mg total) before bedtime.      traZODone 150 MG Oral Tab Take 1 tablet (150 mg total) by mouth nightly. TAKE AT BEDTIME      venlafaxine ER 75 MG Oral Capsule SR 24 Hr Take 1 capsule (75 mg total) by mouth daily. Takes 3 tablets total 225 mg daily      azelastine 137 MCG/SPRAY Nasal Solution 1-2 sprays by Nasal route in the morning and 1-2 sprays before bedtime. FOR SINUS SYMPTOMS/NASAL CONGESTION.. 30 mL 11    fluticasone propionate 50 MCG/ACT Nasal Suspension 2 sprays by Each Nare route daily. FOR NASAL CONGESTION/SINUS SYMPTOMS. 1 each 11    methadone 5 MG Oral Tab Take 25 tablets (125 mg total) by mouth every morning.      ergocalciferol 1.25 MG (63435 UT) Oral Cap Take 1 capsule (50,000 Units total) by mouth once a week.      Omega-3 Fatty Acids (FISH OIL) 300 MG Oral Cap Take 2 capsules by mouth daily.      alprazolam 2 MG Oral Tab  Take 1 tablet (2 mg total) by mouth daily as needed.       Current Facility-Administered Medications Ordered in Epic   Medication Dose Route Frequency Provider Last Rate Last Admin    midazolam (Versed) 2 MG/2ML injection             lactated ringers infusion   Intravenous Continuous Kayce Patterson MD 20 mL/hr at 02/12/25 1304 New Bag at 02/12/25 1304    acetaminophen (Tylenol Extra Strength) tab 1,000 mg  1,000 mg Oral Once Kayce Patterson MD       [3]   Allergies  Allergen Reactions    Seasonal OTHER (SEE COMMENTS)     Sneezing and runny nose.

## 2025-02-12 NOTE — DISCHARGE INSTRUCTIONS
HOME INSTRUCTIONS  [facility] HOME CARE INSTRUCTIONS: POST-OP ANESTHESIA  The medication that you received for sedation or general anesthesia can last up to 24 hours. Your judgment and reflexes may be altered, even if you feel like your normal self.      We Recommend:   Do not drive any motor vehicle or bicycle   Avoid mowing the lawn, playing sports, or working with power tools/applicances (power saws, electric knives or mixers)   That you have someone stay with you on your first night home   Do not drink alcohol or take sleeping pills or tranquilizers   Do not sign legal documents within 24 hours of your procedure   If you had a nerve block for your surgery, take extra care not to put any pressure on your arm or hand for 24 hours    It is normal:  For you to have a sore throat if you had a breathing tube during surgery (while you were asleep!). The sore throat should get better within 48 hours. You can gargle with warm salt water (1/2 tsp in 4 oz warm water) or use a throat lozenge for comfort  To feel muscle aches or soreness especially in the abdomen, chest or neck. The achy feeling should go away in the next 24 hours  To feel weak, sleepy or \"wiped out\". Your should start feeling better in the next 24 hours.   To experience mild discomforts such as sore lip or tongue, headache, cramps, gas pains or a bloated feeling in your abdomen.   To experience mild back pain or soreness for a day or two if you had spinal or epidural anesthesia.   If you had laparoscopic surgery, to feel shoulder pain or discomfort on the day of surgery.   For some patients to have nausea after surgery/anesthesia    If you feel nausea or experience vomiting:   Try to move around less.   Eat less than usual or drink only liquids until the next morning   Nausea should resolve in about 24 hours    If you have a problem when you are at home:    Call your surgeons office   Discharge Instructions: After Your Surgery  You’ve just had surgery. During  surgery, you were given medicine called anesthesia to keep you relaxed and free of pain. After surgery, you may have some pain or nausea. This is common. Here are some tips for feeling better and getting well after surgery.   Going home  Your healthcare provider will show you how to take care of yourself when you go home. They'll also answer your questions. Have an adult family member or friend drive you home. For the first 24 hours after your surgery:   Don't drive or use heavy equipment.  Don't make important decisions or sign legal papers.  Take medicines as directed.  Don't drink alcohol.  Have someone stay with you, if needed. They can watch for problems and help keep you safe.  Be sure to go to all follow-up visits with your healthcare provider. And rest after your surgery for as long as your provider tells you to.   Coping with pain  If you have pain after surgery, pain medicine will help you feel better. Take it as directed, before pain becomes severe. Also, ask your healthcare provider or pharmacist about other ways to control pain. This might be with heat, ice, or relaxation. And follow any other instructions your surgeon or nurse gives you.      Stay on schedule with your medicine.     Tips for taking pain medicine  To get the best relief possible, remember these points:   Pain medicines can upset your stomach. Taking them with a little food may help.  Most pain relievers taken by mouth need at least 20 to 30 minutes to start to work.  Don't wait till your pain becomes severe before you take your medicine. Try to time your medicine so that you can take it before starting an activity. This might be before you get dressed, go for a walk, or sit down for dinner.  Constipation is a common side effect of some pain medicines. Call your healthcare provider before taking any medicines such as laxatives or stool softeners to help ease constipation. Also ask if you should skip any foods. Drinking lots of fluids and  eating foods such as fruits and vegetables that are high in fiber can also help. Remember, don't take laxatives unless your surgeon has prescribed them.  Drinking alcohol and taking pain medicine can cause dizziness and slow your breathing. It can even be deadly. Don't drink alcohol while taking pain medicine.  Pain medicine can make you react more slowly to things. Don't drive or run machinery while taking pain medicine.  Your healthcare provider may tell you to take acetaminophen to help ease your pain. Ask them how much you're supposed to take each day. Acetaminophen or other pain relievers may interact with your prescription medicines or other over-the-counter (OTC) medicines. Some prescription medicines have acetaminophen and other ingredients in them. Using both prescription and OTC acetaminophen for pain can cause you to accidentally overdose. Read the labels on your OTC medicines with care. This will help you to clearly know the list of ingredients, how much to take, and any warnings. It may also help you not take too much acetaminophen. If you have questions or don't understand the information, ask your pharmacist or healthcare provider to explain it to you before you take the OTC medicine.   Managing nausea  Some people have an upset stomach (nausea) after surgery. This is often because of anesthesia, pain, or pain medicine, less movement of food in the stomach, or the stress of surgery. These tips will help you handle nausea and eat healthy foods as you get better. If you were on a special food plan before surgery, ask your healthcare provider if you should follow it while you get better. Check with your provider on how your eating should progress. It may depend on the surgery you had. These general tips may help:   Don't push yourself to eat. Your body will tell you when to eat and how much.  Start off with clear liquids and soup. They're easier to digest.  Next try semi-solid foods as you feel ready.  These include mashed potatoes, applesauce, and gelatin.  Slowly move to solid foods. Don’t eat fatty, rich, or spicy foods at first.  Don't force yourself to have 3 large meals a day. Instead eat smaller amounts more often.  Take pain medicines with a small amount of solid food, such as crackers or toast. This helps prevent nausea.  When to call your healthcare provider  Call your healthcare provider right away if you have any of these:   You still have too much pain, or the pain gets worse, after taking the medicine. The medicine may not be strong enough. Or there may be a complication from the surgery.  You feel too sleepy, dizzy, or groggy. The medicine may be too strong.  Side effects such as nausea or vomiting. Your healthcare provider may advise taking other medicines to .  Skin changes such as rash, itching, or hives. This may mean you have an allergic reaction. Your provider may advise taking other medicines.  The incision looks different (for instance, part of it opens up).  Bleeding or fluid leaking from the incision site, and weren't told to expect that.  Fever of 100.4°F (38°C) or higher, or as directed by your provider.  Call 911  Call 911 right away if you have:   Trouble breathing  Facial swelling    If you have obstructive sleep apnea   You were given anesthesia medicine during surgery to keep you comfortable and free of pain. After surgery, you may have more apnea spells because of this medicine and other medicines you were given. The spells may last longer than normal.    At home:  Keep using the continuous positive airway pressure (CPAP) device when you sleep. Unless your healthcare provider tells you not to, use it when you sleep, day or night. CPAP is a common device used to treat obstructive sleep apnea.  Talk with your provider before taking any pain medicine, muscle relaxants, or sedatives. Your provider will tell you about the possible dangers of taking these medicines.  Contact your  provider if your sleeping changes a lot even when taking medicines as directed.  Phyllis last reviewed this educational content on 10/1/2021  © 4007-1364 [employer]. All rights reserved. This information is not intended as a substitute for professional medical care. Always follow your healthcare professional's instructions.

## 2025-02-13 NOTE — ANESTHESIA POSTPROCEDURE EVALUATION
Patient: Jac Bess    Procedure Summary       Date: 02/12/25 Room / Location: [facility]; [facility] Post Anesthesia Care Unit    Anesthesia Start: 1428 Anesthesia Stop: 1519    Procedure: MRI PITUITARY (W+WO) (CPT=70553) Diagnosis: Pituitary disease (HCC)    Scheduled Providers:  Anesthesiologist: Oppenheim, Stephen, MD    Anesthesia Type: MAC ASA Status: 3            Anesthesia Type: MAC    Vitals Value Taken Time   /67 02/12/25 1545   Temp 98.6 °F (37 °C) 02/12/25 1531   Pulse 75 02/12/25 1545   Resp 12 02/12/25 1538   SpO2 96 % 02/12/25 1545   Vitals shown include unfiled device data.    [facility] AN Post Evaluation:   Patient Evaluated in PACU  Patient Participation: complete - patient participated  Level of Consciousness: awake  Pain Management: adequateYes    Nausea/Vomiting: none  Cardiovascular Status: acceptable  Respiratory Status: acceptable  Postoperative Hydration acceptable      Stephen Oppenheim, MD  2/13/2025 12:33 PM

## 2025-02-24 ENCOUNTER — MED REC SCAN ONLY (OUTPATIENT)
Dept: FAMILY MEDICINE CLINIC | Facility: CLINIC | Age: 40
End: 2025-02-24

## 2025-02-25 ENCOUNTER — OFFICE VISIT (OUTPATIENT)
Dept: NEPHROLOGY | Facility: CLINIC | Age: 40
End: 2025-02-25
Payer: COMMERCIAL

## 2025-02-25 VITALS
DIASTOLIC BLOOD PRESSURE: 69 MMHG | BODY MASS INDEX: 46 KG/M2 | SYSTOLIC BLOOD PRESSURE: 113 MMHG | HEART RATE: 80 BPM | WEIGHT: 315 LBS

## 2025-02-25 DIAGNOSIS — N18.2 CKD (CHRONIC KIDNEY DISEASE) STAGE 2, GFR 60-89 ML/MIN: Primary | ICD-10-CM

## 2025-02-25 PROCEDURE — 3078F DIAST BP <80 MM HG: CPT | Performed by: INTERNAL MEDICINE

## 2025-02-25 PROCEDURE — 99213 OFFICE O/P EST LOW 20 MIN: CPT | Performed by: INTERNAL MEDICINE

## 2025-02-25 PROCEDURE — 3074F SYST BP LT 130 MM HG: CPT | Performed by: INTERNAL MEDICINE

## 2025-02-25 NOTE — PROGRESS NOTES
02/25/25        Patient: Jac Bess   YOB: 1985   Date of Visit: 2/25/2025       Dear  Dr. Linnea MD,      Thank you for referring Jac Bess to my practice.  Please find my assessment and plan below.      As you know he is a 40-year-old male with a history of anxiety/depression, chronic pain syndrome, history of deep venous thrombosis in his left lower extremity, hypertriglyceridemia, history of addiction to narcotic pain relievers and a history of testosterone levels who I now had the pleasure of seeing for follow-up of chronic kidney disease stage II.  Patient underwent a recent renal evaluation.  Of note is that sed rate, connective tissue studies, urinalysis and random urine for Bence-Laird protein was nonrevealing.  A repeat creatinine done on December 24, 2024 is actually better at 1.25 with an estimated GFR of 76 cc/min.  He also had follow-up labs on February 7, 2025 and again creatinine was normal at 1.23 with an estimated GFR of 77 cc/min.    I therefore informed the patient that overall his renal function is better.  When his creatinine had crept up to 1.47 he had been taking ibuprofen but on a fairly regular basis.  He is now off of that.  Renal function has improved.    I therefore reassured the patient that the present time his renal function is normal.  Reinforced importance of maintaining adequate hydration.  Avoid further use of nonsteroidals.  Healthy lifestyle as he gets older to help prevent diseases such as diabetes, coronary disease and hypertension which runs in his family.  He is also being slowly weaned off methadone.  Will repeat a renal panel in 6 months to ensure stability.  If stable I told him I would not need to see him for follow-up unless you felt it was clinically indicated.    Thank you again for allowing me to participate in the care of your patient.  Given questions please were free to call.           Sincerely,   MD HOUSTON Samayoa  [facility], [facility], [place]  [address]  [facility] [id number]    Document electronically generated by:  Dallas Warner MD

## 2025-03-10 ENCOUNTER — PATIENT MESSAGE (OUTPATIENT)
Dept: NEPHROLOGY | Facility: CLINIC | Age: 40
End: 2025-03-10

## 2025-03-16 ENCOUNTER — PATIENT MESSAGE (OUTPATIENT)
Dept: INTERNAL MEDICINE CLINIC | Facility: CLINIC | Age: 40
End: 2025-03-16

## 2025-03-16 DIAGNOSIS — E66.9 OBESITY, UNSPECIFIED CLASS, UNSPECIFIED OBESITY TYPE, UNSPECIFIED WHETHER SERIOUS COMORBIDITY PRESENT: Primary | ICD-10-CM

## 2025-03-17 NOTE — TELEPHONE ENCOUNTER
Please see mychart and pended referral/ order, thanks    Please respond directly to the patient if no additional staff support is required.

## 2025-03-21 ENCOUNTER — OFFICE VISIT (OUTPATIENT)
Dept: SLEEP CENTER | Age: 40
End: 2025-03-21
Attending: INTERNAL MEDICINE
Payer: COMMERCIAL

## 2025-03-21 DIAGNOSIS — R06.83 SNORING: ICD-10-CM

## 2025-03-21 PROCEDURE — 95810 POLYSOM 6/> YRS 4/> PARAM: CPT

## 2025-03-24 ENCOUNTER — ORDER TRANSCRIPTION (OUTPATIENT)
Dept: ADMINISTRATIVE | Facility: HOSPITAL | Age: 40
End: 2025-03-24

## 2025-03-24 DIAGNOSIS — E66.9 OBESITY, UNSPECIFIED: Primary | ICD-10-CM

## 2025-04-26 ENCOUNTER — TELEPHONE (OUTPATIENT)
Dept: NUTRITION | Facility: HOSPITAL | Age: 40
End: 2025-04-26

## 2025-08-28 ENCOUNTER — LAB ENCOUNTER (OUTPATIENT)
Dept: LAB | Age: 40
End: 2025-08-28

## 2025-08-28 DIAGNOSIS — E29.1 TESTICULAR HYPOFUNCTION: Primary | ICD-10-CM

## 2025-08-28 LAB
BASOPHILS # BLD AUTO: 0.05 X10(3) UL (ref 0–0.2)
BASOPHILS NFR BLD AUTO: 0.8 %
DEPRECATED RDW RBC AUTO: 38.6 FL (ref 35.1–46.3)
EOSINOPHIL # BLD AUTO: 0.2 X10(3) UL (ref 0–0.7)
EOSINOPHIL NFR BLD AUTO: 3.1 %
ERYTHROCYTE [DISTWIDTH] IN BLOOD BY AUTOMATED COUNT: 12.6 % (ref 11–15)
ESTRADIOL SERPL-MCNC: 19.3 PG/ML (ref ?–39.8)
FSH SERPL-ACNC: 3.2 MIU/ML (ref 1.4–18.1)
HCT VFR BLD AUTO: 45.3 % (ref 39–53)
HGB BLD-MCNC: 14.7 G/DL (ref 13–17.5)
IMM GRANULOCYTES # BLD AUTO: 0.01 X10(3) UL (ref 0–1)
IMM GRANULOCYTES NFR BLD: 0.2 %
LH SERPL-ACNC: 1.5 MIU/ML (ref 1.5–9.3)
LYMPHOCYTES # BLD AUTO: 2.19 X10(3) UL (ref 1–4)
LYMPHOCYTES NFR BLD AUTO: 33.5 %
MCH RBC QN AUTO: 27.4 PG (ref 26–34)
MCHC RBC AUTO-ENTMCNC: 32.5 G/DL (ref 31–37)
MCV RBC AUTO: 84.4 FL (ref 80–100)
MONOCYTES # BLD AUTO: 0.33 X10(3) UL (ref 0.1–1)
MONOCYTES NFR BLD AUTO: 5 %
NEUTROPHILS # BLD AUTO: 3.76 X10 (3) UL (ref 1.5–7.7)
NEUTROPHILS # BLD AUTO: 3.76 X10(3) UL (ref 1.5–7.7)
NEUTROPHILS NFR BLD AUTO: 57.4 %
PLATELET # BLD AUTO: 203 10(3)UL (ref 150–450)
RBC # BLD AUTO: 5.37 X10(6)UL (ref 4.3–5.7)
TESTOST SERPL-MCNC: 43.36 NG/DL (ref 197.44–669.58)
WBC # BLD AUTO: 6.5 X10(3) UL (ref 4–11)

## 2025-08-28 PROCEDURE — 82670 ASSAY OF TOTAL ESTRADIOL: CPT

## 2025-08-28 PROCEDURE — 83002 ASSAY OF GONADOTROPIN (LH): CPT

## 2025-08-28 PROCEDURE — 85025 COMPLETE CBC W/AUTO DIFF WBC: CPT

## 2025-08-28 PROCEDURE — 36415 COLL VENOUS BLD VENIPUNCTURE: CPT

## 2025-08-28 PROCEDURE — 84403 ASSAY OF TOTAL TESTOSTERONE: CPT

## 2025-08-28 PROCEDURE — 83001 ASSAY OF GONADOTROPIN (FSH): CPT

## (undated) NOTE — LETTER
9/13/2021              [address] [facility]         Dear Scott Ignacio,    This letter is to inform you that our office has made several attempts to reach you by phone without success.   We were attempting to conta

## (undated) NOTE — LETTER
ASTHMA ACTION PLAN for KELLY Bess     : 1985     Date: 9/10/2021  Provider:  Julien Orellana MD  Phone for doctor or clinic: [facility], [address], [place]  [address]  [facility], [phone]

## (undated) NOTE — LETTER
[facility], [address]  [address] [facility], [address] [facility]       06/27/18        Patient: Valentina Quintero   YOB: 1985   Date of Visit: 6/26/2018       Dear  Dr. Mikie Stokes MD,      Thank you for referring Carlin Ruiz

## (undated) NOTE — LETTER
Luke Yarbrough Md  [address]  [place] [address]       02/25/25        Patient: Jac Bess   YOB: 1985   Date of Visit: 2/25/2025       Dear  Dr. Linnea MD,      Thank you for referring Jac Bess to my practice.  Please find my assessment and plan below.      As you know he is a 40-year-old male with a history of anxiety/depression, chronic pain syndrome, history of deep venous thrombosis in his left lower extremity, hypertriglyceridemia, history of addiction to narcotic pain relievers and a history of testosterone levels who I now had the pleasure of seeing for follow-up of chronic kidney disease stage II.  Patient underwent a recent renal evaluation.  Of note is that sed rate, connective tissue studies, urinalysis and random urine for Bence-Laird protein was nonrevealing.  A repeat creatinine done on December 24, 2024 is actually better at 1.25 with an estimated GFR of 76 cc/min.  He also had follow-up labs on February 7, 2025 and again creatinine was normal at 1.23 with an estimated GFR of 77 cc/min.    I therefore informed the patient that overall his renal function is better.  When his creatinine had crept up to 1.47 he had been taking ibuprofen but on a fairly regular basis.  He is now off of that.  Renal function has improved.    I therefore reassured the patient that the present time his renal function is normal.  Reinforced importance of maintaining adequate hydration.  Avoid further use of nonsteroidals.  Healthy lifestyle as he gets older to help prevent diseases such as diabetes, coronary disease and hypertension which runs in his family.  He is also being slowly weaned off methadone.  Will repeat a renal panel in 6 months to ensure stability.  If stable I told him I would not need to see him for follow-up unless you felt it was clinically indicated.    Thank you again for allowing me to participate in the care of your patient.  Given  questions please were free to call.           Sincerely,   Dallas Warner MD   [facility], [facility], [place]  [address]  [facility] [id number]    Document electronically generated by:  Dallas Warner MD

## (undated) NOTE — LETTER
Luke Yarbrough Md  [address]  [address]  [place] [address]       11/05/24        Patient: Jac Bess   YOB: 1985   Date of Visit: 11/5/2024       Dear  Dr. Linnea MD,      Thank you for referring Jac Bess to my practice.  Please find my assessment and plan below.      As you know he is a 39-year-old male with a history of anxiety/depression, chronic pain syndrome, history of a deep venous thrombosis in his left lower extremity, hypertriglyceridemia, history of addiction to narcotic pain relievers and a history of low testosterone levels who I now have the pleasure of seeing for evaluation of chronic kidney disease stage II.  Patient's laboratory studies have been reviewed in care everywhere in epic.  Back on June 2, 2021 he had a creatinine 1.16 with an estimated GFR 81 cc/min.  There is a gap until July 2024 when he had a creatinine 1.47 with an estimated GFR of 62 cc/min.  Repeat labs in July 13, 2024 showed creatinine 1.43 with an estimated GFR of 64 cc/min and renal consultation has now been advised.    His past medical history is significant for anxiety, panic attacks and depression.  He also has a chronic pain syndrome and had in the past been seeing a pain clinic physician.  Primarily chronic low back pain.  That physician have not been taking ibuprofen as needed.  Probably took it 2-3 times per week.  He also placed him on testosterone.  Status post deep venous thrombosis in the left lower extremity.  Testosterone was thought to be playing a role which was discontinued approximately 4 months ago.  Was on Eliquis for 3 months now off.  Medications otherwise as listed.    Social history smoked in the past but quit.  Does vape.  Lives with his wife.  Family history negative for renal pathology.  Review of system patient otherwise states he is doing okay without any chest pain, shortness of breath, GI or urinary tract symptoms.  Anxiety is still an issue  off and on.    On physical exam his blood pressure 102/70 with a pulse of 78 he weighed 373 pounds.  His neck was supple without JVD.  Lungs are clear.  Heart revealed a regular rate and rhythm without gallops or murmurs.  Abdomen was soft, flat, nontender without organomegaly, masses or bruits.  Extremities revealed no edema.    I therefore informed the patient and his wife that he does have an elevated creatinine.  GFR is still above 60.  Therefore represents chronic kidney disease stage II.  Suspect ibuprofen may be playing a role.  Other causes need to be excluded.  For completion sake we will do a CBC, renal panel, urinalysis, urine for microalbumin, sed rate, connective tissue profile.  The patient just had a renal ultrasound done on September 11, 2024 which was unremarkable.  He also had a recent MRI done on October 9, 2024.  Severe hepatic steatosis and mild splenomegaly was noted.  A left adrenal nodule was also noted which appears to be a benign lipid rich adenoma.  The patient is seeing endocrine as well.  Further impressions and recommendations will be forthcoming after reviewing the above.  Reinforced importance of maintaining adequate duration.  Avoid any further use of ibuprofen or other nonsteroidals.    Thank you again for allowing me to participate in the care of your patient.  If you have any questions please feel free to call.           Sincerely,   Dallas Warner MD   [facility], [facility], [place]  [address]  [facility] [address]    Document electronically generated by:  Dallas Warner MD

## (undated) NOTE — LETTER
No referring provider defined for this encounter.        05/03/21        Patient: Jerry Palm   YOB: 1985   Date of Visit: 4/30/2021       Dear  Dr. Rica Dimas MD,      Thank you for referring Jerry Palm to my practic